# Patient Record
Sex: MALE | Race: WHITE | NOT HISPANIC OR LATINO | Employment: FULL TIME | URBAN - NONMETROPOLITAN AREA
[De-identification: names, ages, dates, MRNs, and addresses within clinical notes are randomized per-mention and may not be internally consistent; named-entity substitution may affect disease eponyms.]

---

## 2021-06-01 ENCOUNTER — HOSPITAL ENCOUNTER (OUTPATIENT)
Dept: NON INVASIVE DIAGNOSTICS | Facility: HOSPITAL | Age: 31
Discharge: HOME | End: 2021-06-01
Payer: COMMERCIAL

## 2021-06-01 DIAGNOSIS — R22.41 LOCALIZED SWELLING, MASS AND LUMP, RIGHT LOWER LIMB: ICD-10-CM

## 2021-06-01 DIAGNOSIS — M79.604 PAIN IN RIGHT LEG: ICD-10-CM

## 2021-06-01 DIAGNOSIS — R31.0 GROSS HEMATURIA: ICD-10-CM

## 2021-06-01 DIAGNOSIS — M51.27 OTHER INTERVERTEBRAL DISC DISPLACEMENT, LUMBOSACRAL REGION: ICD-10-CM

## 2021-06-01 PROCEDURE — 93971 EXTREMITY STUDY: CPT

## 2021-06-01 PROCEDURE — 93971 EXTREMITY STUDY: CPT | Performed by: SURGERY

## 2021-06-25 ENCOUNTER — HOSPITAL ENCOUNTER (OUTPATIENT)
Dept: ULTRASOUND IMAGING | Facility: HOSPITAL | Age: 31
Discharge: HOME | End: 2021-06-25
Payer: COMMERCIAL

## 2021-06-25 DIAGNOSIS — R22.41 LOCALIZED SWELLING, MASS AND LUMP, RIGHT LOWER LIMB: ICD-10-CM

## 2021-06-25 PROCEDURE — 76882 US LMTD JT/FCL EVL NVASC XTR: CPT

## 2021-07-09 ENCOUNTER — HOSPITAL ENCOUNTER (OUTPATIENT)
Dept: MRI IMAGING | Facility: HOSPITAL | Age: 31
Discharge: HOME | End: 2021-07-09
Payer: COMMERCIAL

## 2021-07-09 DIAGNOSIS — M51.27 OTHER INTERVERTEBRAL DISC DISPLACEMENT, LUMBOSACRAL REGION: ICD-10-CM

## 2021-07-09 PROCEDURE — 72148 MRI LUMBAR SPINE W/O DYE: CPT

## 2021-08-23 ENCOUNTER — HOSPITAL ENCOUNTER (OUTPATIENT)
Dept: ULTRASOUND IMAGING | Facility: HOSPITAL | Age: 31
Discharge: HOME | End: 2021-08-23
Attending: STUDENT IN AN ORGANIZED HEALTH CARE EDUCATION/TRAINING PROGRAM
Payer: COMMERCIAL

## 2021-08-23 DIAGNOSIS — R22.41 LOCALIZED SWELLING, MASS AND LUMP, RIGHT LOWER LIMB: ICD-10-CM

## 2021-08-23 PROCEDURE — 76882 US LMTD JT/FCL EVL NVASC XTR: CPT

## 2021-08-30 ENCOUNTER — TRANSCRIBE ORDERS (OUTPATIENT)
Dept: CARDIOLOGY CLINIC | Facility: CLINIC | Age: 31
End: 2021-08-30

## 2021-08-30 DIAGNOSIS — R31.0 GROSS HEMATURIA: Primary | ICD-10-CM

## 2021-09-01 ENCOUNTER — TELEPHONE (OUTPATIENT)
Dept: UROLOGY | Facility: MEDICAL CENTER | Age: 31
End: 2021-09-01

## 2021-09-01 NOTE — TELEPHONE ENCOUNTER
Npt returning call,he was contacted regarding referral he stated he's scheduled for CT Scan tomorrow 9/2,please review records if appointment is time sensitive or wait for CT Scan results

## 2021-09-01 NOTE — TELEPHONE ENCOUNTER
Attempted to reach patient at [phone]  A voice message was left asking patient to return office call  Office number was provided

## 2021-09-01 NOTE — TELEPHONE ENCOUNTER
Patient calling to verify that he is scheduled for ct scan tomorrow  Pt reports he is under the care of his pcp  Based on results of ct scan he may need a new patient appointment with urology

## 2021-09-01 NOTE — TELEPHONE ENCOUNTER
Mplaint/Diagnosis:Gross Hematuria    Insurance:BC BS PPO    History of Cancer:NO    Previous urologist:NO    Outside testing/where:[facility]    If yes,what kind:LABS    Records requested/where:[facility]/CARE EVERY[facility]    Preferred location:[place]

## 2021-09-02 ENCOUNTER — HOSPITAL ENCOUNTER (OUTPATIENT)
Dept: CT IMAGING | Facility: HOSPITAL | Age: 31
Discharge: HOME | End: 2021-09-02
Payer: COMMERCIAL

## 2021-09-02 DIAGNOSIS — R31.0 GROSS HEMATURIA: ICD-10-CM

## 2021-09-02 PROCEDURE — 74178 CT ABD&PLV WO CNTR FLWD CNTR: CPT

## 2021-09-02 RX ADMIN — IOHEXOL 100 ML: 350 INJECTION, SOLUTION INTRAVENOUS at 09:04

## 2021-09-04 ENCOUNTER — HOSPITAL ENCOUNTER (EMERGENCY)
Facility: HOSPITAL | Age: 31
Discharge: HOME | End: 2021-09-04
Attending: EMERGENCY MEDICINE | Admitting: EMERGENCY MEDICINE
Payer: COMMERCIAL

## 2021-09-04 VITALS
RESPIRATION RATE: 16 BRPM | HEART RATE: 75 BPM | OXYGEN SATURATION: 98 % | DIASTOLIC BLOOD PRESSURE: 58 MMHG | TEMPERATURE: 97.9 F | WEIGHT: 189.6 LBS | SYSTOLIC BLOOD PRESSURE: 119 MMHG

## 2021-09-04 DIAGNOSIS — N39.0 URINARY TRACT INFECTION: Primary | ICD-10-CM

## 2021-09-04 DIAGNOSIS — D72.829 LEUKOCYTOSIS: ICD-10-CM

## 2021-09-04 DIAGNOSIS — M54.9 MUSCULOSKELETAL BACK PAIN: ICD-10-CM

## 2021-09-04 LAB
ALBUMIN SERPL BCP-MCNC: 4.6 G/DL (ref 3.5–5)
ALP SERPL-CCNC: 73 U/L (ref 46–116)
ALT SERPL W P-5'-P-CCNC: 36 U/L (ref 12–78)
ANION GAP SERPL CALCULATED.3IONS-SCNC: 10 MMOL/L (ref 4–13)
AST SERPL W P-5'-P-CCNC: 16 U/L (ref 5–45)
BASOPHILS # BLD AUTO: 0.05 THOUSANDS/ΜL (ref 0–0.1)
BASOPHILS NFR BLD AUTO: 0 % (ref 0–1)
BILIRUB SERPL-MCNC: 2.04 MG/DL (ref 0.2–1)
BUN SERPL-MCNC: 17 MG/DL (ref 5–25)
CALCIUM SERPL-MCNC: 8.7 MG/DL (ref 8.3–10.1)
CHLORIDE SERPL-SCNC: 100 MMOL/L (ref 100–108)
CO2 SERPL-SCNC: 28 MMOL/L (ref 21–32)
CREAT SERPL-MCNC: 1.05 MG/DL (ref 0.6–1.3)
EOSINOPHIL # BLD AUTO: 0.01 THOUSAND/ΜL (ref 0–0.61)
EOSINOPHIL NFR BLD AUTO: 0 % (ref 0–6)
ERYTHROCYTE [DISTWIDTH] IN BLOOD BY AUTOMATED COUNT: 12.1 % (ref 11.6–15.1)
GFR SERPL CREATININE-BSD FRML MDRD: 95 ML/MIN/1.73SQ M
GLUCOSE SERPL-MCNC: 137 MG/DL (ref 65–140)
HCT VFR BLD AUTO: 42.3 % (ref 36.5–49.3)
HGB BLD-MCNC: 15.1 G/DL (ref 12–17)
IMM GRANULOCYTES # BLD AUTO: 0.12 THOUSAND/UL (ref 0–0.2)
IMM GRANULOCYTES NFR BLD AUTO: 1 % (ref 0–2)
LIPASE SERPL-CCNC: 107 U/L (ref 73–393)
LYMPHOCYTES # BLD AUTO: 1.67 THOUSANDS/ΜL (ref 0.6–4.47)
LYMPHOCYTES NFR BLD AUTO: 9 % (ref 14–44)
MCH RBC QN AUTO: 30.6 PG (ref 26.8–34.3)
MCHC RBC AUTO-ENTMCNC: 35.7 G/DL (ref 31.4–37.4)
MCV RBC AUTO: 86 FL (ref 82–98)
MONOCYTES # BLD AUTO: 1.97 THOUSAND/ΜL (ref 0.17–1.22)
MONOCYTES NFR BLD AUTO: 11 % (ref 4–12)
NEUTROPHILS # BLD AUTO: 14.71 THOUSANDS/ΜL (ref 1.85–7.62)
NEUTS SEG NFR BLD AUTO: 79 % (ref 43–75)
NRBC BLD AUTO-RTO: 0 /100 WBCS
PLATELET # BLD AUTO: 315 THOUSANDS/UL (ref 149–390)
PMV BLD AUTO: 9.7 FL (ref 8.9–12.7)
POTASSIUM SERPL-SCNC: 3.4 MMOL/L (ref 3.5–5.3)
PROT SERPL-MCNC: 7.9 G/DL (ref 6.4–8.2)
RBC # BLD AUTO: 4.93 MILLION/UL (ref 3.88–5.62)
SODIUM SERPL-SCNC: 138 MMOL/L (ref 136–145)
WBC # BLD AUTO: 18.53 THOUSAND/UL (ref 4.31–10.16)

## 2021-09-04 PROCEDURE — 96374 THER/PROPH/DIAG INJ IV PUSH: CPT

## 2021-09-04 PROCEDURE — 36415 COLL VENOUS BLD VENIPUNCTURE: CPT | Performed by: EMERGENCY MEDICINE

## 2021-09-04 PROCEDURE — 85025 COMPLETE CBC W/AUTO DIFF WBC: CPT | Performed by: EMERGENCY MEDICINE

## 2021-09-04 PROCEDURE — 83690 ASSAY OF LIPASE: CPT | Performed by: EMERGENCY MEDICINE

## 2021-09-04 PROCEDURE — 96372 THER/PROPH/DIAG INJ SC/IM: CPT

## 2021-09-04 PROCEDURE — 99283 EMERGENCY DEPT VISIT LOW MDM: CPT

## 2021-09-04 PROCEDURE — 99285 EMERGENCY DEPT VISIT HI MDM: CPT | Performed by: EMERGENCY MEDICINE

## 2021-09-04 PROCEDURE — 80053 COMPREHEN METABOLIC PANEL: CPT | Performed by: EMERGENCY MEDICINE

## 2021-09-04 RX ORDER — ONDANSETRON 2 MG/ML
4 INJECTION INTRAMUSCULAR; INTRAVENOUS ONCE
Status: COMPLETED | OUTPATIENT
Start: 2021-09-04 | End: 2021-09-04

## 2021-09-04 RX ORDER — CIPROFLOXACIN 500 MG/1
500 TABLET, FILM COATED ORAL ONCE
Status: COMPLETED | OUTPATIENT
Start: 2021-09-04 | End: 2021-09-04

## 2021-09-04 RX ORDER — CIPROFLOXACIN 500 MG/1
500 TABLET, FILM COATED ORAL 2 TIMES DAILY
Qty: 10 TABLET | Refills: 0 | Status: SHIPPED | OUTPATIENT
Start: 2021-09-04 | End: 2021-09-09

## 2021-09-04 RX ORDER — DIAZEPAM 5 MG/1
5 TABLET ORAL ONCE
Status: COMPLETED | OUTPATIENT
Start: 2021-09-04 | End: 2021-09-04

## 2021-09-04 RX ORDER — KETOROLAC TROMETHAMINE 30 MG/ML
30 INJECTION, SOLUTION INTRAMUSCULAR; INTRAVENOUS ONCE
Status: COMPLETED | OUTPATIENT
Start: 2021-09-04 | End: 2021-09-04

## 2021-09-04 RX ADMIN — KETOROLAC TROMETHAMINE 30 MG: 30 INJECTION, SOLUTION INTRAMUSCULAR at 21:23

## 2021-09-04 RX ADMIN — CIPROFLOXACIN 500 MG: 500 TABLET, FILM COATED ORAL at 23:20

## 2021-09-04 RX ADMIN — DIAZEPAM 5 MG: 5 TABLET ORAL at 21:33

## 2021-09-04 RX ADMIN — ONDANSETRON 4 MG: 2 INJECTION INTRAMUSCULAR; INTRAVENOUS at 21:32

## 2021-09-05 NOTE — ED PROVIDER NOTES
History  Chief Complaint   Patient presents with    Back Pain     Pt c/o "bad spasm" in right back since 1800 with vomiting - states CT scan done this week for hematuria but do not know results     Patient is a 51-year-old male with history of lumbar herniated disc presents emergency department complaining of left lower back pain and spasm started 2 days ago came on severe and resolved with a muscle relaxer that he took occurred again this evening the patient also had noted some blood in the urine and had contacted his PCP about this and was ordered to have a CT scan of the abdomen and pelvis which was performed 2 days ago but has not yet resulted  No fevers or chills  Pain worsened by movement and feels like a spasm or cramp in the back  No trauma or injury to the back  No loss of bladder or bowel      History provided by:  Patient  Back Pain  Location:  Lumbar spine  Quality: Spasm  Radiates to:  Does not radiate  Pain severity:  Moderate  Onset quality:  Sudden  Duration:  2 hours  Timing:  Constant  Progression:  Worsening  Chronicity:  Recurrent  Associated symptoms: no abdominal pain, no chest pain, no dysuria, no fever, no headaches, no numbness and no weakness        None       Past Medical History:   Diagnosis Date    Lumbar herniated disc        Past Surgical History:   Procedure Laterality Date    REPAIR LABRUM Bilateral        History reviewed  No pertinent family history  I have reviewed and agree with the history as documented  E-Cigarette/Vaping     E-Cigarette/Vaping Substances     Social History     Tobacco Use    Smoking status: Never Smoker    Smokeless tobacco: Never Used   Substance Use Topics    Alcohol use: Yes     Comment: socially    Drug use: Yes     Types: Marijuana       Review of Systems   Constitutional: Negative for activity change, appetite change, chills, fatigue and fever  HENT: Negative for congestion, ear pain, rhinorrhea and sore throat      Eyes: Negative for discharge, redness and visual disturbance  Respiratory: Negative for cough, chest tightness, shortness of breath and wheezing  Cardiovascular: Negative for chest pain and palpitations  Gastrointestinal: Negative for abdominal pain, constipation, diarrhea, nausea and vomiting  Endocrine: Negative for polydipsia and polyuria  Genitourinary: Positive for hematuria  Negative for difficulty urinating, dysuria, frequency and urgency  Musculoskeletal: Positive for back pain  Negative for arthralgias and myalgias  Skin: Negative for color change, pallor and rash  Neurological: Negative for dizziness, weakness, light-headedness, numbness and headaches  Hematological: Negative for adenopathy  Does not bruise/bleed easily  All other systems reviewed and are negative  Physical Exam  Physical Exam  Vitals and nursing note reviewed  Constitutional:       Appearance: He is well-developed  HENT:      Head: Normocephalic and atraumatic  Right Ear: External ear normal       Left Ear: External ear normal       Nose: Nose normal    Eyes:      Conjunctiva/sclera: Conjunctivae normal       Pupils: Pupils are equal, round, and reactive to light  Cardiovascular:      Rate and Rhythm: Normal rate and regular rhythm  Heart sounds: Normal heart sounds  Pulmonary:      Effort: Pulmonary effort is normal  No respiratory distress  Breath sounds: Normal breath sounds  No wheezing or rales  Chest:      Chest wall: No tenderness  Abdominal:      General: Bowel sounds are normal  There is no distension  Palpations: Abdomen is soft  Tenderness: There is no abdominal tenderness  There is no guarding  Musculoskeletal:         General: Normal range of motion  Cervical back: Normal range of motion and neck supple  Lumbar back: Spasms and tenderness present  Skin:     General: Skin is warm and dry     Neurological:      Mental Status: He is alert and oriented to person, place, and time       Cranial Nerves: No cranial nerve deficit  Sensory: No sensory deficit           Vital Signs  ED Triage Vitals [09/04/21 2101]   Temperature Pulse Respirations Blood Pressure SpO2   97 9 °F (36 6 °C) 76 20 156/95 98 %      Temp Source Heart Rate Source Patient Position - Orthostatic VS BP Location FiO2 (%)   Temporal Monitor Sitting Right arm --      Pain Score       Worst Possible Pain           Vitals:    09/04/21 2101 09/04/21 2218   BP: 156/95 119/58   Pulse: 76 75   Patient Position - Orthostatic VS: Sitting Lying         Visual Acuity      ED Medications  Medications   ciprofloxacin (CIPRO) tablet 500 mg (has no administration in time range)   ketorolac (TORADOL) injection 30 mg (30 mg Intramuscular Given 9/4/21 2123)   diazepam (VALIUM) tablet 5 mg (5 mg Oral Given 9/4/21 2133)   ondansetron (ZOFRAN) injection 4 mg (4 mg Intravenous Given 9/4/21 2132)       Diagnostic Studies  Results Reviewed     Procedure Component Value Units Date/Time    Comprehensive metabolic panel [[id number]]  (Abnormal) Collected: 09/04/21 2131    Lab Status: Final result Specimen: Blood from Arm, Left Updated: 09/04/21 2152     Sodium 138 mmol/L      Potassium 3 4 mmol/L      Chloride 100 mmol/L      CO2 28 mmol/L      ANION GAP 10 mmol/L      BUN 17 mg/dL      Creatinine 1 05 mg/dL      Glucose 137 mg/dL      Calcium 8 7 mg/dL      AST 16 U/L      ALT 36 U/L      Alkaline Phosphatase 73 U/L      Total Protein 7 9 g/dL      Albumin 4 6 g/dL      Total Bilirubin 2 04 mg/dL      eGFR 95 ml/min/1 73sq m     Narrative:      [place] guidelines for Chronic Kidney Disease (CKD):     Stage 1 with normal or high GFR (GFR > 90 mL/min/1 73 square meters)    Stage 2 Mild CKD (GFR = 60-89 mL/min/1 73 square meters)    Stage 3A Moderate CKD (GFR = 45-59 mL/min/1 73 square meters)    Stage 3B Moderate CKD (GFR = 30-44 mL/min/1 73 square meters)    Stage 4 Severe CKD (GFR = 15-29 mL/min/1 73 square meters)    Stage 5 End Stage CKD (GFR <15 mL/min/1 73 square meters)  Note: GFR calculation is accurate only with a steady state creatinine    Lipase [[id number]]  (Normal) Collected: 09/04/21 2131    Lab Status: Final result Specimen: Blood from Arm, Left Updated: 09/04/21 2152     Lipase 107 u/L     CBC and differential [[id number]]  (Abnormal) Collected: 09/04/21 2131    Lab Status: Final result Specimen: Blood from Arm, Left Updated: 09/04/21 2139     WBC 18 53 Thousand/uL      RBC 4 93 Million/uL      Hemoglobin 15 1 g/dL      Hematocrit 42 3 %      MCV 86 fL      MCH 30 6 pg      MCHC 35 7 g/dL      RDW 12 1 %      MPV 9 7 fL      Platelets 167 Thousands/uL      nRBC 0 /100 WBCs      Neutrophils Relative 79 %      Immat GRANS % 1 %      Lymphocytes Relative 9 %      Monocytes Relative 11 %      Eosinophils Relative 0 %      Basophils Relative 0 %      Neutrophils Absolute 14 71 Thousands/µL      Immature Grans Absolute 0 12 Thousand/uL      Lymphocytes Absolute 1 67 Thousands/µL      Monocytes Absolute 1 97 Thousand/µL      Eosinophils Absolute 0 01 Thousand/µL      Basophils Absolute 0 05 Thousands/µL     UA w Reflex to Microscopic w Reflex to Culture [[id number]]     Lab Status: No result Specimen: Urine                  No orders to display              Procedures  Procedures         ED Course  ED Course as of Sep 04 2317   Sat Sep 04, 2021   2201 Reviewed prior bilirubin labs elevated patient reports he has hx of bud  TOTAL BILIRUBIN(!): 2 04        CT abdomen pelvis w wo contrast    Result Date: 9/4/2021  Narrative: CT ABDOMEN AND PELVIS WITH AND WITHOUT IV CONTRAST INDICATION:   R31 0: Gross hematuria  COMPARISON: None  TECHNIQUE: Initial CT of the abdomen and pelvis was performed without intravenous contrast   Subsequently, postcontrast CT evaluation of the abdomen and pelvis was performed simultaneously in both nephrographic and delayed post contrast phases using a split bolus technique    Axial, sagittal, and coronal 2D reformatted images were created from the source data and submitted for interpretation  This examination, like all CT scans performed in the [facility], was performed utilizing techniques to minimize radiation dose exposure, including the use of iterative reconstruction and automated exposure control  Rad dose 1547 06 mGy-cm IV Contrast:  100 mL of iohexol (OMNIPAQUE) Enteric Contrast:  Enteric contrast was administered  FINDINGS: ABDOMEN RIGHT KIDNEY AND URETER: No solid renal mass  No detectable urothelial mass  No hydronephrosis or hydroureter  There is a 3 mm nonobstructing midpole calculus  No perinephric collection  LEFT KIDNEY AND URETER: No solid renal mass  No detectable urothelial mass  No hydronephrosis or hydroureter  There is a 4 mm nonobstructing midpole calculus  No perinephric collection  URINARY BLADDER: No bladder wall mass  No calculi  LOWER CHEST:  No clinically significant abnormality identified in the visualized lower chest  LIVER/BILIARY TREE:  Unremarkable  GALLBLADDER:  No calcified gallstones  No pericholecystic inflammatory change  SPLEEN:  Unremarkable  PANCREAS:  Unremarkable  ADRENAL GLANDS:  Unremarkable  STOMACH AND BOWEL:  Unremarkable  ABDOMINOPELVIC CAVITY:  No ascites  No free intraperitoneal air  No lymphadenopathy  VESSELS:  Unremarkable for patient's age  PELVIS REPRODUCTIVE ORGANS:  Unremarkable for patient's age  APPENDIX: No findings to suggest appendicitis  ABDOMINAL WALL/INGUINAL REGIONS:  Unremarkable  OSSEOUS STRUCTURES:  No acute fracture or destructive osseous lesion  Impression: Bilateral subcentimeter nonobstructing intrarenal calculi  No hydronephrosis or hydroureter  No acute intra-abdominal abnormality  No free air or free fluid  Questionable mild diffuse urinary bladder wall thickening which may be secondary to underdistention  Correlation with a urinalysis is recommended   Workstation performed: [id number] US extremity soft tissue    Result Date: 8/26/2021  Narrative: SUPERFICIAL SOFT TISSUE ULTRASOUND INDICATION:   R22 41: Localized swelling, mass and lump, right lower limb  Lump without change in the thigh COMPARISON:  6/25/2021 TECHNIQUE:   Real-time ultrasound of the medial upper posterior thigh was performed with a linear transducer with both volumetric sweeps and still imaging techniques  FINDINGS:  There is a subcutaneous nodule which is mildly echogenic as compared to surrounding subcutaneous fat and measures 8 x 6 x 12 mm  Previously the transverse dimension was somewhat underestimated and, measured similarly, there has been no significant change in size  The margins are still not well defined  This was close to the buttock  Minimal vascularity is noted  Impression: Ill-defined lesion has not resolved making acute inflammation less likely and other etiologies such as lipoma, angiolipoma or other lesion more likely  Histologic sampling still could be considered  The study was marked in [facility] for significant notification  Workstation performed: [id number]                         SBIRT 22yo+      Most Recent Value   SBIRT (25 yo +)   In order to provide better care to our patients, we are screening all of our patients for alcohol and drug use  Would it be okay to ask you these screening questions? No Filed at: 09/04/2021 2133                    MDM  Number of Diagnoses or Management Options  Leukocytosis: new and requires workup  Musculoskeletal back pain: new and requires workup  Urinary tract infection: new and requires workup  Diagnosis management comments: Patient remained clinically and hemodynamically stable in the emergency department he is afebrile nontoxic well-appearing    Patient felt much improved with rest Toradol and Valium given in the ED   leukocytosis likely secondary to recent steroid tapers that the patient was given suspect the lower back pain to be musculoskeletal however the patient also having suprapubic pressure hematuria on recent urinalysis and presence of leukocytosis and possible urinary tract infection seen on CT scan will treat for possible urinary tract infection with ciprofloxacin for now advised continue NSAIDs and muscle relaxers and supportive care for back pain and follow up promptly with PCP for further evaluation treatment obtain test results return precautions and anticipatory guidance discussed  Amount and/or Complexity of Data Reviewed  Clinical lab tests: reviewed and ordered  Tests in the radiology section of CPT®: reviewed  Tests in the medicine section of CPT®: reviewed and ordered  Decide to obtain previous medical records or to obtain history from someone other than the patient: yes  Review and summarize past medical records: yes  Independent visualization of images, tracings, or specimens: yes    Risk of Complications, Morbidity, and/or Mortality  Presenting problems: moderate  Diagnostic procedures: moderate  Management options: moderate    Patient Progress  Patient progress: stable      Disposition  Final diagnoses:   Urinary tract infection   Musculoskeletal back pain   Leukocytosis     Time reflects when diagnosis was documented in both MDM as applicable and the Disposition within this note     Time User Action Codes Description Comment    9/4/2021 11:08 PM Thyra Catrachita Add [N39 0] Urinary tract infection     9/4/2021 11:09 PM Thyra Catrachita Add [M54 9] Musculoskeletal back pain     9/4/2021 11:09 PM Thyra Catrachita Add [F54 996] Leukocytosis       ED Disposition     ED Disposition Condition Date/Time Comment    Discharge Stable Sat Sep 4, 2021 11:09 PM Wandy Friend discharge to home/self care              Follow-up Information     Follow up With Specialties Details Why Contact Info    Tory Nguyen DO Internal Medicine Schedule an appointment as soon as possible for a visit in 3 days  [address] [place] [address] [address] [facility]  [phone] Patient's Medications   Discharge Prescriptions    CIPROFLOXACIN (CIPRO) 500 MG TABLET    Take 1 tablet (500 mg total) by mouth 2 (two) times a day for 5 days       Start Date: 9/4/2021  End Date: 9/9/2021       Order Dose: 500 mg       Quantity: 10 tablet    Refills: 0     No discharge procedures on file      PDMP Review     None          ED Provider  Electronically Signed by           Lisa Dyer DO  09/04/21 4564

## 2021-09-16 ENCOUNTER — HOSPITAL ENCOUNTER (EMERGENCY)
Facility: HOSPITAL | Age: 31
Discharge: HOME | End: 2021-09-16
Attending: EMERGENCY MEDICINE
Payer: COMMERCIAL

## 2021-09-16 ENCOUNTER — APPOINTMENT (EMERGENCY)
Dept: CT IMAGING | Facility: HOSPITAL | Age: 31
End: 2021-09-16
Payer: COMMERCIAL

## 2021-09-16 VITALS
HEART RATE: 82 BPM | TEMPERATURE: 98.1 F | OXYGEN SATURATION: 100 % | DIASTOLIC BLOOD PRESSURE: 70 MMHG | WEIGHT: 193 LBS | SYSTOLIC BLOOD PRESSURE: 124 MMHG | RESPIRATION RATE: 16 BRPM

## 2021-09-16 DIAGNOSIS — N20.1 URETEROLITHIASIS: Primary | ICD-10-CM

## 2021-09-16 LAB
ANION GAP SERPL CALCULATED.3IONS-SCNC: 11 MMOL/L (ref 4–13)
BASOPHILS # BLD AUTO: 0.04 THOUSANDS/ΜL (ref 0–0.1)
BASOPHILS NFR BLD AUTO: 0 % (ref 0–1)
BILIRUB UR QL STRIP: NEGATIVE
BUN SERPL-MCNC: 17 MG/DL (ref 5–25)
CALCIUM SERPL-MCNC: 8.9 MG/DL (ref 8.3–10.1)
CHLORIDE SERPL-SCNC: 100 MMOL/L (ref 100–108)
CLARITY UR: CLEAR
CO2 SERPL-SCNC: 27 MMOL/L (ref 21–32)
COLOR UR: YELLOW
CREAT SERPL-MCNC: 1.01 MG/DL (ref 0.6–1.3)
EOSINOPHIL # BLD AUTO: 0.01 THOUSAND/ΜL (ref 0–0.61)
EOSINOPHIL NFR BLD AUTO: 0 % (ref 0–6)
ERYTHROCYTE [DISTWIDTH] IN BLOOD BY AUTOMATED COUNT: 11.9 % (ref 11.6–15.1)
GFR SERPL CREATININE-BSD FRML MDRD: 100 ML/MIN/1.73SQ M
GLUCOSE SERPL-MCNC: 119 MG/DL (ref 65–140)
GLUCOSE UR STRIP-MCNC: NEGATIVE MG/DL
HCT VFR BLD AUTO: 42.5 % (ref 36.5–49.3)
HGB BLD-MCNC: 15.3 G/DL (ref 12–17)
HGB UR QL STRIP.AUTO: NEGATIVE
IMM GRANULOCYTES # BLD AUTO: 0.1 THOUSAND/UL (ref 0–0.2)
IMM GRANULOCYTES NFR BLD AUTO: 1 % (ref 0–2)
KETONES UR STRIP-MCNC: ABNORMAL MG/DL
LEUKOCYTE ESTERASE UR QL STRIP: NEGATIVE
LYMPHOCYTES # BLD AUTO: 0.96 THOUSANDS/ΜL (ref 0.6–4.47)
LYMPHOCYTES NFR BLD AUTO: 5 % (ref 14–44)
MCH RBC QN AUTO: 31.2 PG (ref 26.8–34.3)
MCHC RBC AUTO-ENTMCNC: 36 G/DL (ref 31.4–37.4)
MCV RBC AUTO: 87 FL (ref 82–98)
MONOCYTES # BLD AUTO: 1.8 THOUSAND/ΜL (ref 0.17–1.22)
MONOCYTES NFR BLD AUTO: 10 % (ref 4–12)
NEUTROPHILS # BLD AUTO: 15.95 THOUSANDS/ΜL (ref 1.85–7.62)
NEUTS SEG NFR BLD AUTO: 84 % (ref 43–75)
NITRITE UR QL STRIP: NEGATIVE
NRBC BLD AUTO-RTO: 0 /100 WBCS
PH UR STRIP.AUTO: 6.5 [PH]
PLATELET # BLD AUTO: 282 THOUSANDS/UL (ref 149–390)
PMV BLD AUTO: 9.4 FL (ref 8.9–12.7)
POTASSIUM SERPL-SCNC: 3.8 MMOL/L (ref 3.5–5.3)
PROT UR STRIP-MCNC: NEGATIVE MG/DL
RBC # BLD AUTO: 4.91 MILLION/UL (ref 3.88–5.62)
SODIUM SERPL-SCNC: 138 MMOL/L (ref 136–145)
SP GR UR STRIP.AUTO: 1.02 (ref 1–1.03)
UROBILINOGEN UR QL STRIP.AUTO: 0.2 E.U./DL
WBC # BLD AUTO: 18.86 THOUSAND/UL (ref 4.31–10.16)

## 2021-09-16 PROCEDURE — 74176 CT ABD & PELVIS W/O CONTRAST: CPT

## 2021-09-16 PROCEDURE — 81003 URINALYSIS AUTO W/O SCOPE: CPT | Performed by: EMERGENCY MEDICINE

## 2021-09-16 PROCEDURE — 99284 EMERGENCY DEPT VISIT MOD MDM: CPT

## 2021-09-16 PROCEDURE — 96361 HYDRATE IV INFUSION ADD-ON: CPT

## 2021-09-16 PROCEDURE — 85025 COMPLETE CBC W/AUTO DIFF WBC: CPT | Performed by: EMERGENCY MEDICINE

## 2021-09-16 PROCEDURE — 99284 EMERGENCY DEPT VISIT MOD MDM: CPT | Performed by: EMERGENCY MEDICINE

## 2021-09-16 PROCEDURE — 96375 TX/PRO/DX INJ NEW DRUG ADDON: CPT

## 2021-09-16 PROCEDURE — 36415 COLL VENOUS BLD VENIPUNCTURE: CPT | Performed by: EMERGENCY MEDICINE

## 2021-09-16 PROCEDURE — 80048 BASIC METABOLIC PNL TOTAL CA: CPT | Performed by: EMERGENCY MEDICINE

## 2021-09-16 PROCEDURE — 96374 THER/PROPH/DIAG INJ IV PUSH: CPT

## 2021-09-16 RX ORDER — GABAPENTIN 300 MG/1
CAPSULE ORAL
COMMUNITY
Start: 2021-08-19

## 2021-09-16 RX ORDER — MORPHINE SULFATE 4 MG/ML
4 INJECTION, SOLUTION INTRAMUSCULAR; INTRAVENOUS ONCE
Status: COMPLETED | OUTPATIENT
Start: 2021-09-16 | End: 2021-09-16

## 2021-09-16 RX ORDER — TAMSULOSIN HYDROCHLORIDE 0.4 MG/1
CAPSULE ORAL
COMMUNITY
Start: 2021-09-08

## 2021-09-16 RX ORDER — MORPHINE SULFATE 15 MG/1
15 TABLET ORAL EVERY 8 HOURS PRN
Qty: 6 TABLET | Refills: 0 | Status: SHIPPED | OUTPATIENT
Start: 2021-09-16

## 2021-09-16 RX ORDER — TIZANIDINE 2 MG/1
TABLET ORAL
COMMUNITY
Start: 2021-08-19

## 2021-09-16 RX ORDER — TRAMADOL HYDROCHLORIDE 50 MG/1
50 TABLET ORAL
COMMUNITY
Start: 2021-09-08

## 2021-09-16 RX ORDER — KETOROLAC TROMETHAMINE 30 MG/ML
15 INJECTION, SOLUTION INTRAMUSCULAR; INTRAVENOUS ONCE
Status: COMPLETED | OUTPATIENT
Start: 2021-09-16 | End: 2021-09-16

## 2021-09-16 RX ORDER — ONDANSETRON 2 MG/ML
4 INJECTION INTRAMUSCULAR; INTRAVENOUS ONCE
Status: COMPLETED | OUTPATIENT
Start: 2021-09-16 | End: 2021-09-16

## 2021-09-16 RX ORDER — ONDANSETRON 4 MG/1
4 TABLET, FILM COATED ORAL EVERY 6 HOURS PRN
Qty: 10 TABLET | Refills: 0 | Status: SHIPPED | OUTPATIENT
Start: 2021-09-16

## 2021-09-16 RX ADMIN — KETOROLAC TROMETHAMINE 15 MG: 30 INJECTION, SOLUTION INTRAMUSCULAR at 14:01

## 2021-09-16 RX ADMIN — ONDANSETRON 4 MG: 2 INJECTION INTRAMUSCULAR; INTRAVENOUS at 14:05

## 2021-09-16 RX ADMIN — SODIUM CHLORIDE 1000 ML: 0.9 INJECTION, SOLUTION INTRAVENOUS at 14:01

## 2021-09-16 RX ADMIN — MORPHINE SULFATE 4 MG: 4 INJECTION INTRAVENOUS at 14:52

## 2021-09-16 NOTE — ED PROVIDER NOTES
History  Chief Complaint   Patient presents with    Flank Pain     Pt c/o left flank for the past few weeks - saw urologist and had xray yesterday - states hx of hematuria ~ 2 months ago - has also been vomiting     36year old male with wife c/o worsening left flank pain ongoing for the past few months, recently seeing Urology and had CT followed up with plain film yesterday  Not amenable to tramadol  No fever chills  Notes persistent nausea and vomiting  Denies hematuria, dysuria, frequency  History provided by:  Patient and spouse  Flank Pain  Pain location:  L flank  Pain quality: aching and sharp    Pain radiates to:  Does not radiate  Pain severity:  Severe  Onset quality:  Unable to specify  Timing:  Constant  Progression:  Worsening  Chronicity:  Chronic  Associated symptoms: no chest pain, no fever, no hematuria and no shortness of breath        Prior to Admission Medications   Prescriptions Last Dose Informant Patient Reported? Taking?   gabapentin (NEURONTIN) 300 mg capsule   Yes No   tamsulosin (FLOMAX) 0 4 mg   Yes Yes   Sig: Take by mouth   tiZANidine (ZANAFLEX) 2 mg tablet   Yes Yes   Sig: TAKE 1 TABLET (2 MG TOTAL) BY MOUTH EVERY 8 (EIGHT) HOURS AS NEEDED FOR MUSCLE SPASMS    traMADol (ULTRAM) 50 mg tablet   Yes Yes   Sig: Take 50 mg by mouth      Facility-Administered Medications: None       Past Medical History:   Diagnosis Date    Lumbar herniated disc        Past Surgical History:   Procedure Laterality Date    REPAIR LABRUM Bilateral        History reviewed  No pertinent family history  I have reviewed and agree with the history as documented  E-Cigarette/Vaping     E-Cigarette/Vaping Substances     Social History     Tobacco Use    Smoking status: Never Smoker    Smokeless tobacco: Never Used   Substance Use Topics    Alcohol use: Yes     Comment: socially    Drug use: Yes     Types: Marijuana       Review of Systems   Constitutional: Negative for fever     Respiratory: Negative for shortness of breath  Cardiovascular: Negative for chest pain  Genitourinary: Positive for flank pain  Negative for hematuria  All other systems reviewed and are negative  Physical Exam  Physical Exam  Vitals and nursing note reviewed  Constitutional:       Comments: Pleasant, comfortable-appearing   HENT:      Head: Normocephalic and atraumatic  Eyes:      Conjunctiva/sclera: Conjunctivae normal       Pupils: Pupils are equal, round, and reactive to light  Cardiovascular:      Rate and Rhythm: Normal rate and regular rhythm  Heart sounds: Normal heart sounds  Pulmonary:      Effort: Pulmonary effort is normal       Breath sounds: Normal breath sounds  Abdominal:      General: Bowel sounds are normal  There is no distension  Palpations: Abdomen is soft  Tenderness: There is no abdominal tenderness  Comments: No abdominal or flank tenderness, no left flank swelling or overlying skin changes, moves normally without apparent worsening discomfort   Musculoskeletal:         General: Normal range of motion  Cervical back: Neck supple  Skin:     General: Skin is warm and dry  Neurological:      Mental Status: He is alert and oriented to person, place, and time  Cranial Nerves: No cranial nerve deficit  Coordination: Coordination normal    Psychiatric:         Behavior: Behavior normal          Thought Content:  Thought content normal          Judgment: Judgment normal          Vital Signs  ED Triage Vitals [09/16/21 1313]   Temperature Pulse Respirations Blood Pressure SpO2   98 4 °F (36 9 °C) 94 18 140/90 98 %      Temp Source Heart Rate Source Patient Position - Orthostatic VS BP Location FiO2 (%)   Temporal Monitor Lying Right arm --      Pain Score       6           Vitals:    09/16/21 1313 09/16/21 1507   BP: 140/90 124/77   Pulse: 94 83   Patient Position - Orthostatic VS: Lying Lying         Visual Acuity      ED Medications  Medications sodium chloride 0 9 % bolus 1,000 mL (0 mL Intravenous Stopped 9/16/21 1529)   ketorolac (TORADOL) injection 15 mg (15 mg Intravenous Given 9/16/21 1401)   ondansetron (ZOFRAN) injection 4 mg (4 mg Intravenous Given 9/16/21 1405)   morphine (PF) 4 mg/mL injection 4 mg (4 mg Intravenous Given 9/16/21 1452)       Diagnostic Studies  Results Reviewed     Procedure Component Value Units Date/Time    UA w Reflex to Microscopic w Reflex to Culture [[id number]]  (Abnormal) Collected: 09/16/21 1517    Lab Status: Final result Specimen: Urine, Clean Catch Updated: 09/16/21 1524     Color, UA Yellow     Clarity, UA Clear     Specific [place], UA 1 020     pH, UA 6 5     Leukocytes, UA Negative     Nitrite, UA Negative     Protein, UA Negative mg/dl      Glucose, UA Negative mg/dl      Ketones, UA 15 (1+) mg/dl      Urobilinogen, UA 0 2 E U /dl      Bilirubin, UA Negative     Blood, UA Negative    Basic metabolic panel [[id number]] Collected: 09/16/21 1411    Lab Status: Final result Specimen: Blood from Arm, Right Updated: 09/16/21 1431     Sodium 138 mmol/L      Potassium 3 8 mmol/L      Chloride 100 mmol/L      CO2 27 mmol/L      ANION GAP 11 mmol/L      BUN 17 mg/dL      Creatinine 1 01 mg/dL      Glucose 119 mg/dL      Calcium 8 9 mg/dL      eGFR 100 ml/min/1 73sq m     Narrative:      [place] guidelines for Chronic Kidney Disease (CKD):     Stage 1 with normal or high GFR (GFR > 90 mL/min/1 73 square meters)    Stage 2 Mild CKD (GFR = 60-89 mL/min/1 73 square meters)    Stage 3A Moderate CKD (GFR = 45-59 mL/min/1 73 square meters)    Stage 3B Moderate CKD (GFR = 30-44 mL/min/1 73 square meters)    Stage 4 Severe CKD (GFR = 15-29 mL/min/1 73 square meters)    Stage 5 End Stage CKD (GFR <15 mL/min/1 73 square meters)  Note: GFR calculation is accurate only with a steady state creatinine    CBC and differential [[id number]]  (Abnormal) Collected: 09/16/21 1411    Lab Status: Final result Specimen: Blood from Arm, Right Updated: 09/16/21 1421     WBC 18 86 Thousand/uL      RBC 4 91 Million/uL      Hemoglobin 15 3 g/dL      Hematocrit 42 5 %      MCV 87 fL      MCH 31 2 pg      MCHC 36 0 g/dL      RDW 11 9 %      MPV 9 4 fL      Platelets 127 Thousands/uL      nRBC 0 /100 WBCs      Neutrophils Relative 84 %      Immat GRANS % 1 %      Lymphocytes Relative 5 %      Monocytes Relative 10 %      Eosinophils Relative 0 %      Basophils Relative 0 %      Neutrophils Absolute 15 95 Thousands/µL      Immature Grans Absolute 0 10 Thousand/uL      Lymphocytes Absolute 0 96 Thousands/µL      Monocytes Absolute 1 80 Thousand/µL      Eosinophils Absolute 0 01 Thousand/µL      Basophils Absolute 0 04 Thousands/µL                  CT renal stone study abdomen pelvis without contrast   Final Result by Mariposa Boland MD (09/16 1359)      Obstructive 4 mm left UVJ calculus #2/142 causes mild hydroureteronephrosis  The study was marked in [facility] for immediate notification  Workstation performed: [id number]                    Procedures  Procedures         ED Course  ED Course as of Sep 16 1601   Thu Sep 16, 2021   1410 IMPRESSION:     Obstructive 4 mm left UVJ calculus #2/142 causes mild hydroureteronephrosis  CT renal stone study abdomen pelvis without contrast   1412 Informed of results, pain improving      1437 WBC(!): 18 86   1437 Neutrophils %(!): 84   1549 Ketones, UA(!): 15 (1+)   1554 Feels well, discomfort 2/10    Agree with close outpatient follow-up with his urologist, spouse present and supportive                                              MDM    Disposition  Final diagnoses:   Ureterolithiasis     Time reflects when diagnosis was documented in both MDM as applicable and the Disposition within this note     Time User Action Codes Description Comment    9/16/2021  2:11 PM Shawna Mortensen Add [N20 1] Ureterolithiasis       ED Disposition     ED Disposition Condition Date/Time Comment    Discharge Stable Thu Sep 16, 2021  3:54 PM Yasmeen Maya discharge to home/self care  Follow-up Information     Follow up With Specialties Details Why DO [facility] Call today  [address] [address]  [phone]            Patient's Medications   Discharge Prescriptions    MORPHINE (MSIR) 15 MG TABLET    Take 1 tablet (15 mg total) by mouth every 8 (eight) hours as needed for severe pain for up to 6 dosesMax Daily Amount: 45 mg       Start Date: 9/16/2021 End Date: --       Order Dose: 15 mg       Quantity: 6 tablet    Refills: 0    ONDANSETRON (ZOFRAN) 4 MG TABLET    Take 1 tablet (4 mg total) by mouth every 6 (six) hours as needed for nausea or vomiting       Start Date: 9/16/2021 End Date: --       Order Dose: 4 mg       Quantity: 10 tablet    Refills: 0     No discharge procedures on file      PDMP Review     None          ED Provider  Electronically Signed by           Courtney Alexis DO  09/16/21 6407

## 2022-01-13 PROCEDURE — 87636 SARSCOV2 & INF A&B AMP PRB: CPT | Performed by: INTERNAL MEDICINE

## 2024-11-27 RX ORDER — IBUPROFEN 200 MG
TABLET ORAL EVERY 6 HOURS PRN
COMMUNITY

## 2024-11-27 NOTE — PRE-PROCEDURE INSTRUCTIONS
Pre-Surgery Instructions:   Medication Instructions    Ascorbic Acid (VITAMIN C PO) Stop taking 7 days prior to surgery.    Cyanocobalamin (VITAMIN B-12 PO) Stop taking 7 days prior to surgery.    ibuprofen (MOTRIN) 200 mg tablet Stop taking 3 days prior to surgery.    Omega-3 Fatty Acids (FISH OIL PO) Stop taking 7 days prior to surgery.    VITAMIN D PO Stop taking 7 days prior to surgery.    Medication instructions for day surgery reviewed. Please use only a sip of water to take your instructed medications. Avoid all over the counter vitamins, supplements and NSAIDS for one week prior to surgery per anesthesia guidelines. Tylenol is ok to take as needed.     You will receive a call one business day prior to surgery with an arrival time and hospital directions. If your surgery is scheduled on a Monday, the hospital will be calling you on the Friday prior to your surgery. If you have not heard from anyone by 8pm, please call the hospital supervisor through the hospital  at [phone]. ([place] [phone] or [place] [phone]).    Do not eat or drink anything after midnight the night before your surgery, including candy, mints, lifesavers, or chewing gum. Do not drink alcohol 24hrs before your surgery. Try not to smoke at least 24hrs before your surgery.       Follow the pre surgery showering instructions as listed in the “My Surgical Experience Booklet” or otherwise provided by your surgeon's office. Do not use a blade to shave the surgical area 1 week before surgery. It is okay to use a clean electric clippers up to 24 hours before surgery. Do not apply any lotions, creams, including makeup, cologne, deodorant, or perfumes after showering on the day of your surgery. Do not use dry shampoo, hair spray, hair gel, or any type of hair products.     No contact lenses, eye make-up, or artificial eyelashes. Remove nail polish, including gel polish, and any artificial, gel, or acrylic nails if possible.  Remove all jewelry including rings and body piercing jewelry.     Wear causal clothing that is easy to take on and off. Consider your type of surgery.    Keep any valuables, jewelry, piercings at home. Please bring any specially ordered equipment (sling, braces) if indicated.    Arrange for a responsible person to drive you to and from the hospital on the day of your surgery. Please confirm the visitor policy for the day of your procedure when you receive your phone call with an arrival time.     Call the surgeon's office with any new illnesses, exposures, or additional questions prior to surgery.    Please reference your “My Surgical Experience Booklet” for additional information to prepare for your upcoming surgery.  Pt has surgical soap.

## 2024-12-07 ENCOUNTER — ANESTHESIA EVENT (OUTPATIENT)
Dept: PERIOP | Facility: HOSPITAL | Age: 34
End: 2024-12-07
Payer: COMMERCIAL

## 2024-12-07 NOTE — ANESTHESIA PREPROCEDURE EVALUATION
"Procedure:  EXCISION LIPOMAS OF RIGHT BUTTOCK, RT UPPER THIGH AND LEFT GROIN (Bilateral: Buttocks)    Relevant Problems   ANESTHESIA   (+) Motion sickness      CARDIO (within normal limits)      ENDO (within normal limits)      GI/HEPATIC (within normal limits)      /RENAL (within normal limits)      HEMATOLOGY (within normal limits)      NEURO/PSYCH (within normal limits)      Behavioral Health   (+) Marijuana smoker      Other Pediatrics   (+) Gilbert syndrome      Lab Results   Component Value Date    WBC 18.86 (H) 09/16/2021    HGB 15.3 09/16/2021    HCT 42.5 09/16/2021    MCV 87 09/16/2021     09/16/2021     Lab Results   Component Value Date    SODIUM 137 02/05/2024    K 4.2 02/05/2024     02/05/2024    CO2 25 02/05/2024    BUN 12 02/05/2024    CREATININE 0.8 02/05/2024    GLUC 102 02/05/2024    CALCIUM 9.1 02/05/2024     No results found for: \"INR\", \"PROTIME\"  No results found for: \"HGBA1C\"         Physical Exam    Airway    Mallampati score: I  TM Distance: >3 FB  Neck ROM: full     Dental       Cardiovascular  Cardiovascular exam normal    Pulmonary  Pulmonary exam normal     Other Findings        Anesthesia Plan  ASA Score- 2     Anesthesia Type- IV sedation with anesthesia with ASA Monitors.         Additional Monitors:     Airway Plan:            Plan Factors-Exercise tolerance (METS): >4 METS.    Chart reviewed.   Existing labs reviewed. Patient summary reviewed.                  Induction- intravenous.    Postoperative Plan-         Informed Consent- Anesthetic plan and risks discussed with patient.  I personally reviewed this patient with the CRNA. Discussed and agreed on the Anesthesia Plan with the CRNA..        "

## 2024-12-09 ENCOUNTER — HOSPITAL ENCOUNTER (OUTPATIENT)
Facility: HOSPITAL | Age: 34
Setting detail: OUTPATIENT SURGERY
Discharge: HOME | End: 2024-12-09
Attending: STUDENT IN AN ORGANIZED HEALTH CARE EDUCATION/TRAINING PROGRAM | Admitting: STUDENT IN AN ORGANIZED HEALTH CARE EDUCATION/TRAINING PROGRAM
Payer: COMMERCIAL

## 2024-12-09 ENCOUNTER — ANESTHESIA (OUTPATIENT)
Dept: PERIOP | Facility: HOSPITAL | Age: 34
End: 2024-12-09
Payer: COMMERCIAL

## 2024-12-09 VITALS
RESPIRATION RATE: 18 BRPM | HEART RATE: 70 BPM | DIASTOLIC BLOOD PRESSURE: 80 MMHG | TEMPERATURE: 97.8 F | OXYGEN SATURATION: 98 % | WEIGHT: 210 LBS | BODY MASS INDEX: 28.44 KG/M2 | HEIGHT: 72 IN | SYSTOLIC BLOOD PRESSURE: 129 MMHG

## 2024-12-09 DIAGNOSIS — D17.9 BENIGN LIPOMATOUS NEOPLASM, UNSPECIFIED: ICD-10-CM

## 2024-12-09 PROBLEM — F12.90 MARIJUANA SMOKER: Status: ACTIVE | Noted: 2024-12-09

## 2024-12-09 PROCEDURE — 88304 TISSUE EXAM BY PATHOLOGIST: CPT | Performed by: STUDENT IN AN ORGANIZED HEALTH CARE EDUCATION/TRAINING PROGRAM

## 2024-12-09 RX ORDER — ONDANSETRON 2 MG/ML
INJECTION INTRAMUSCULAR; INTRAVENOUS AS NEEDED
Status: DISCONTINUED | OUTPATIENT
Start: 2024-12-09 | End: 2024-12-09

## 2024-12-09 RX ORDER — PROPOFOL 10 MG/ML
INJECTION, EMULSION INTRAVENOUS CONTINUOUS PRN
Status: DISCONTINUED | OUTPATIENT
Start: 2024-12-09 | End: 2024-12-09

## 2024-12-09 RX ORDER — MAGNESIUM HYDROXIDE 1200 MG/15ML
LIQUID ORAL AS NEEDED
Status: DISCONTINUED | OUTPATIENT
Start: 2024-12-09 | End: 2024-12-09 | Stop reason: HOSPADM

## 2024-12-09 RX ORDER — BUPIVACAINE HYDROCHLORIDE AND EPINEPHRINE 2.5; 5 MG/ML; UG/ML
INJECTION, SOLUTION INFILTRATION; PERINEURAL AS NEEDED
Status: DISCONTINUED | OUTPATIENT
Start: 2024-12-09 | End: 2024-12-09 | Stop reason: HOSPADM

## 2024-12-09 RX ORDER — SODIUM CHLORIDE, SODIUM LACTATE, POTASSIUM CHLORIDE, CALCIUM CHLORIDE 600; 310; 30; 20 MG/100ML; MG/100ML; MG/100ML; MG/100ML
INJECTION, SOLUTION INTRAVENOUS CONTINUOUS PRN
Status: DISCONTINUED | OUTPATIENT
Start: 2024-12-09 | End: 2024-12-09

## 2024-12-09 RX ORDER — ONDANSETRON 2 MG/ML
4 INJECTION INTRAMUSCULAR; INTRAVENOUS ONCE AS NEEDED
Status: DISCONTINUED | OUTPATIENT
Start: 2024-12-09 | End: 2024-12-09 | Stop reason: HOSPADM

## 2024-12-09 RX ORDER — FENTANYL CITRATE/PF 50 MCG/ML
25 SYRINGE (ML) INJECTION
Status: DISCONTINUED | OUTPATIENT
Start: 2024-12-09 | End: 2024-12-09 | Stop reason: HOSPADM

## 2024-12-09 RX ORDER — LIDOCAINE HYDROCHLORIDE 10 MG/ML
INJECTION, SOLUTION EPIDURAL; INFILTRATION; INTRACAUDAL; PERINEURAL AS NEEDED
Status: DISCONTINUED | OUTPATIENT
Start: 2024-12-09 | End: 2024-12-09

## 2024-12-09 RX ORDER — KETAMINE HCL IN NACL, ISO-OSM 100MG/10ML
SYRINGE (ML) INJECTION AS NEEDED
Status: DISCONTINUED | OUTPATIENT
Start: 2024-12-09 | End: 2024-12-09

## 2024-12-09 RX ORDER — FENTANYL CITRATE 50 UG/ML
INJECTION, SOLUTION INTRAMUSCULAR; INTRAVENOUS AS NEEDED
Status: DISCONTINUED | OUTPATIENT
Start: 2024-12-09 | End: 2024-12-09

## 2024-12-09 RX ORDER — MIDAZOLAM HYDROCHLORIDE 2 MG/2ML
INJECTION, SOLUTION INTRAMUSCULAR; INTRAVENOUS AS NEEDED
Status: DISCONTINUED | OUTPATIENT
Start: 2024-12-09 | End: 2024-12-09

## 2024-12-09 RX ADMIN — SODIUM CHLORIDE 8 MCG: 9 INJECTION, SOLUTION INTRAVENOUS at 09:00

## 2024-12-09 RX ADMIN — MIDAZOLAM 2 MG: 1 INJECTION INTRAMUSCULAR; INTRAVENOUS at 08:55

## 2024-12-09 RX ADMIN — SODIUM CHLORIDE, SODIUM LACTATE, POTASSIUM CHLORIDE, AND CALCIUM CHLORIDE: .6; .31; .03; .02 INJECTION, SOLUTION INTRAVENOUS at 08:55

## 2024-12-09 RX ADMIN — Medication 20 MG: at 09:00

## 2024-12-09 RX ADMIN — FENTANYL CITRATE 50 MCG: 50 INJECTION, SOLUTION INTRAMUSCULAR; INTRAVENOUS at 09:03

## 2024-12-09 RX ADMIN — ONDANSETRON 4 MG: 2 INJECTION INTRAMUSCULAR; INTRAVENOUS at 09:39

## 2024-12-09 RX ADMIN — PROPOFOL 130 MCG/KG/MIN: 10 INJECTION, EMULSION INTRAVENOUS at 09:00

## 2024-12-09 RX ADMIN — LIDOCAINE HYDROCHLORIDE 50 MG: 10 INJECTION, SOLUTION EPIDURAL; INFILTRATION; INTRACAUDAL; PERINEURAL at 09:00

## 2024-12-09 NOTE — OP NOTE
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  The areas of surgery were marked preoperatively.  A timeout was held the patient identified and procedure verified.  Appropriate antibiotic prophylaxis and DVT prophylaxis was used.  The patient was carefully placed in the left lateral decubitus position.  All pressure points were padded.  Sedation was administered.  The area of the right buttock was prepped and draped needle sterile fashion using chlorhexidine.  Local anesthesia using 0.25% Marcaine with epinephrine was infiltrated over the palpable lipoma.  Dissection was carried down through subcutaneous tissue using blunt dissection.  The lipoma was immediately  encountered.  The lipoma was freed of surrounding subcutaneous tissue using blunt and sharp dissection.  Lipoma was removed in its entirety.  The area was irrigated.  Subcutaneous tissue was closed using 3-0 Vicryl.  Skin was closing 4-0 Vicryl in the subcuticular layer.  Incision was covered with skin glue.  The glue was allowed to dry and the patient was then placed in the supine position.  Bilateral upper thighs were prepped and draped in usual sterile fashion using chlorhexidine.  Local anesthesia was infiltrated over the palpable lipoma of the left upper thigh.  An incision was made using a 15 blade scalpel.  The subcutaneous tissue surrounding the lipoma was bluntly dissected free.  The lipoma was removed in its entirety.  Skin was closing 4-0 Vicryl in the subcuticular layer.  Local anesthesia was infiltrated over the palpable right upper thigh lipoma.  An incision was made using a 15 blade scalpel.  Dissection was carried down into subcutaneous tissue using blunt dissection.  Lipoma was encountered and freed of surrounding tissue using sharp dissection.  The lipoma was removed in its entirety.  The vision was closing 4-0 Vicryl in the subcuticular layer.  The incisions were covered with skin glue.  The patient tolerated procedure well transferred to recovery in stable condition.  At the end the procedure all needle instrument sponge counts were correct x 2.   I was present for the entire procedure. and A physician assistant was required during the procedure for retraction, tissue handling, dissection and suturing.    Patient Disposition:  PACU     This procedure was not performed to treat primary cutaneous melanoma through wide local excision           SIGNATURE: Le Castillo DO  DATE: December 9, 2024  TIME: 9:45 AM

## 2024-12-09 NOTE — DISCHARGE INSTR - AVS FIRST PAGE
Lipoma Excision  Call Dr. Castillo's office with any questions or concerns [phone]    AFTER YOU LEAVE:     You may shower but do not tub bathe for 2 weeks.  The incision may get wet and pat dry.  The glue will fall off on its own in 1-2 weeks.    Use Tylenol or ibuprofen as directed on the bottle for pain control    You may apply ice to the incision as needed for comfort    Follow-up as scheduled    Seek care immediately or call 911 if:   You feel a lump in your throat or have trouble swallowing.    You suddenly have trouble breathing.

## 2024-12-09 NOTE — ANESTHESIA POSTPROCEDURE EVALUATION
Post-Op Assessment Note    CV Status:  Stable    Pain management: adequate       Mental Status:  Alert and awake   Hydration Status:  Euvolemic   PONV Controlled:  Controlled   Airway Patency:  Patent     Post Op Vitals Reviewed: Yes    No anethesia notable event occurred.    Staff: Anesthesiologist         Last Filed PACU Vitals:  Vitals Value Taken Time   Temp 97.8 °F (36.6 °C) 12/09/24 1020   Pulse 80 12/09/24 1023   /75 12/09/24 1020   Resp 21 12/09/24 1023   SpO2 96 % 12/09/24 1023   Vitals shown include unfiled device data.    Modified Danny:  Activity: 2 (12/9/2024 10:55 AM)  Respiration: 2 (12/9/2024 10:55 AM)  Circulation: 2 (12/9/2024 10:55 AM)  Consciousness: 2 (12/9/2024 10:55 AM)  Oxygen Saturation: 2 (12/9/2024 10:55 AM)  Modified Danny Score: 10 (12/9/2024 10:55 AM)

## 2024-12-09 NOTE — ANESTHESIA POSTPROCEDURE EVALUATION
Post-Op Assessment Note    CV Status:  Stable  Pain Score: 0    Pain management: adequate       Mental Status:  Alert and awake   Hydration Status:  Stable and euvolemic   PONV Controlled:  None   Airway Patency:  Patent and adequate     Post Op Vitals Reviewed: Yes    No anethesia notable event occurred.    Staff: CRNA           Last Filed PACU Vitals:  Vitals Value Taken Time   Temp 97.6 °F (36.4 °C) 12/09/24 0950   Pulse 75 12/09/24 0952   /72 12/09/24 0950   Resp 15 12/09/24 0952   SpO2 97 % 12/09/24 0952   Vitals shown include unfiled device data.    Modified Danny:  Activity: 2 (12/9/2024  9:50 AM)  Respiration: 2 (12/9/2024  9:50 AM)  Circulation: 2 (12/9/2024  9:50 AM)  Consciousness: 1 (12/9/2024  9:50 AM)  Oxygen Saturation: 1 (12/9/2024  9:50 AM)  Modified Danny Score: 8 (12/9/2024  9:50 AM)

## 2024-12-09 NOTE — H&P
[facility] [place]    Date: 12/9/2024      H&P    Referring Provider: Abbey Peterson PA-C    CC: Multiple lipomas    HPI: This is a 34-year-old male who presents due to multiple lipomas. The patient states he has 3 areas he is concerned about. There was an area of the left groin which is uncomfortable. He states his pants rub on this area. There is an additional lipoma of the right upper thigh. This area is slightly painful. There is an additional small lipoma of the right buttock which is also slightly uncomfortable.     History:  Past Medical History:   Diagnosis Date   Depressive disorder, not elsewhere classified   GILBERT SYNDROME 7/21/2009   Spina bifida occulta 12/28/2010     Past Surgical History:   Procedure Laterality Date   DENTAL SURGERY PROCEDURE NEC   EXCISION OF LESION FOR PATHOLOGY Right 09/08/2021   Excision of lump R upper leg/[place]   SHOULDER SURGERY PROCEDURE NEC Left   labral tear repair   SHOULDER SURGERY PROCEDURE NEC Right     Family History   Problem Relation Name Age of Onset   Stroke Grandmother (Maternal)     Social History     Tobacco Use   Smoking status: Never   Smokeless tobacco: Never   Vaping Use   Vaping status: Some Days   Substance Use Topics   Alcohol use: Not Currently   Comment: social   Drug use: Yes   Types: Marijuana   Comment: has medical card     Review of patient's allergies indicates:   Allergen Reactions   Amoxicillin   Pt thinks he may get a rash.   Has taken keflex without reaction   Gabapentin   Other reaction(s): Other (See Comments)  Nausea ,dizziness     No current outpatient medications on file.     No current facility-administered medications for this visit.       ROS:  Constitutional: Denies weakness and fatigue  ENT: Denies congestion  Resp: Denies shortness of breath  Cardiac: Denies chest pain  GI: Denies abdominal pain, nausea, vomiting  Musculoskeletal: Denies muscle ache  Neuro: Denies headache  Heme: Denies history of  "bleeding or blood clots  Endo: Denies fever  Skin: Denies skin changes    PE:  Vital Signs: /94   Pulse 82   Temp 98.4 °F (36.9 °C) (Oral)   Resp 18   Ht 5' 11.5\" (1.816 m)   Wt 95.3 kg (210 lb)   SpO2 95%   BMI 28.88 kg/m²     Gen: No acute distress, appears comfortable  HEENT: Normocephalic, atraumatic  Lungs: Clear to auscultation bilateral  Heart: Regular rate and rhythm, no murmur  Ext: No edema  Skin: Positive for 2 cm lipoma of the left upper thigh, 1 cm lipoma of the right upper thigh, and 1 cm lipoma of the right buttock area which is deep and difficult to palpate  Neuro: Awake, alert, oriented x3    Labs:  None available    Radiology: None available    Assessment:  34-year-old male who presents due to 3 lipomas which are bothersome. The lipomas of the right and left upper thigh area are easily amenable to excision. The right buttock area is deep and difficult to feel on examination.     Plan:   Options were discussed with the patient including continued observation or surgical excision. The patient would like to proceed with surgical excision of all 3 areas. He would prefer for this to be completed under sedation in the operating room.  The procedure of excision of lipomas of bilateral upper thighs, and right buttock was discussed in detail with the patient. Risks were discussed including bleeding and infection. The patient expressed understanding and agrees to proceed        "

## 2024-12-12 PROCEDURE — 88304 TISSUE EXAM BY PATHOLOGIST: CPT | Performed by: STUDENT IN AN ORGANIZED HEALTH CARE EDUCATION/TRAINING PROGRAM

## 2024-12-23 RX ORDER — ASCORBIC ACID
CRYSTALS ORAL
COMMUNITY

## 2024-12-23 NOTE — PRE-PROCEDURE INSTRUCTIONS
Pre-Surgery Instructions:   Medication Instructions    Ascorbic Acid (VITAMIN C PO) Stop taking 7 days prior to surgery.    Cyanocobalamin (VITAMIN B-12 PO) Stop taking 7 days prior to surgery.    Omega-3 Fatty Acids (FISH OIL PO) Stop taking 7 days prior to surgery.    VITAMIN D PO Stop taking 7 days prior to surgery.    B Complex Vitamins (B COMPLEX PO) Stop taking 7 days prior to surgery.    Vitamin E 200 units TABS Stop taking 7 days prior to surgery.   Medication instructions for day surgery reviewed. Please use only a sip of water to take your instructed medications. Avoid all over the counter vitamins, supplements and NSAIDS for one week prior to surgery per anesthesia guidelines. Tylenol is ok to take as needed.     You will receive a call one business day prior to surgery with an arrival time and hospital directions. If your surgery is scheduled on a Monday, the hospital will be calling you on the Friday prior to your surgery. If you have not heard from anyone by 8pm, please call the hospital supervisor through the hospital  at [phone]. ([place] [phone] or [place] [phone]).    Do not eat or drink anything after midnight the night before your surgery, including candy, mints, lifesavers, or chewing gum. Do not drink alcohol 24hrs before your surgery. Try not to smoke at least 24hrs before your surgery.       Follow the pre surgery showering instructions as listed in the “My Surgical Experience Booklet” or otherwise provided by your surgeon's office. Do not use a blade to shave the surgical area 1 week before surgery. It is okay to use a clean electric clippers up to 24 hours before surgery. Do not apply any lotions, creams, including makeup, cologne, deodorant, or perfumes after showering on the day of your surgery. Do not use dry shampoo, hair spray, hair gel, or any type of hair products.     No contact lenses, eye make-up, or artificial eyelashes. Remove nail polish, including gel  polish, and any artificial, gel, or acrylic nails if possible. Remove all jewelry including rings and body piercing jewelry.     Wear causal clothing that is easy to take on and off. Consider your type of surgery.    Keep any valuables, jewelry, piercings at home. Please bring any specially ordered equipment (sling, braces) if indicated.    Arrange for a responsible person to drive you to and from the hospital on the day of your surgery. Please confirm the visitor policy for the day of your procedure when you receive your phone call with an arrival time.     Call the surgeon's office with any new illnesses, exposures, or additional questions prior to surgery.    Please reference your “My Surgical Experience Booklet” for additional information to prepare for your upcoming surgery.

## 2024-12-26 ENCOUNTER — ANESTHESIA EVENT (OUTPATIENT)
Dept: PERIOP | Facility: HOSPITAL | Age: 34
End: 2024-12-26
Payer: COMMERCIAL

## 2024-12-26 NOTE — ANESTHESIA PREPROCEDURE EVALUATION
Procedure:  EXCISION LIPOMA OF RIGHT POSTERIOR THIGH LIPOMA X2, EXCISION OF LEFT FOREARM LIPOMA (Bilateral: Thigh)    Relevant Problems   ANESTHESIA   (+) Motion sickness        Physical Exam    Airway    Mallampati score: II  TM Distance: >3 FB  Neck ROM: full     Dental   No notable dental hx     Cardiovascular  Cardiovascular exam normal    Pulmonary  Pulmonary exam normal     Other Findings        Anesthesia Plan  ASA Score- 2     Anesthesia Type- IV sedation with anesthesia with ASA Monitors.         Additional Monitors:     Airway Plan:            Plan Factors-Exercise tolerance (METS): >4 METS.    Chart reviewed.  Imaging results reviewed. Existing labs reviewed. Patient summary reviewed.    Patient is not a current smoker.      Obstructive sleep apnea risk education given perioperatively.        Induction- intravenous.    Postoperative Plan-     Perioperative Resuscitation Plan - Level 1 - Full Code.       Informed Consent- Anesthetic plan and risks discussed with patient.  I personally reviewed this patient with the CRNA. Discussed and agreed on the Anesthesia Plan with the CRNA..

## 2024-12-31 ENCOUNTER — ANESTHESIA (OUTPATIENT)
Dept: PERIOP | Facility: HOSPITAL | Age: 34
End: 2024-12-31
Payer: COMMERCIAL

## 2024-12-31 ENCOUNTER — HOSPITAL ENCOUNTER (OUTPATIENT)
Facility: HOSPITAL | Age: 34
Setting detail: OUTPATIENT SURGERY
Discharge: HOME | End: 2024-12-31
Attending: STUDENT IN AN ORGANIZED HEALTH CARE EDUCATION/TRAINING PROGRAM | Admitting: STUDENT IN AN ORGANIZED HEALTH CARE EDUCATION/TRAINING PROGRAM
Payer: COMMERCIAL

## 2024-12-31 VITALS
TEMPERATURE: 97.2 F | HEIGHT: 72 IN | DIASTOLIC BLOOD PRESSURE: 88 MMHG | HEART RATE: 63 BPM | BODY MASS INDEX: 28.44 KG/M2 | RESPIRATION RATE: 18 BRPM | SYSTOLIC BLOOD PRESSURE: 142 MMHG | OXYGEN SATURATION: 99 % | WEIGHT: 210 LBS

## 2024-12-31 DIAGNOSIS — D17.9 BENIGN LIPOMATOUS NEOPLASM, UNSPECIFIED: ICD-10-CM

## 2024-12-31 PROCEDURE — 88304 TISSUE EXAM BY PATHOLOGIST: CPT | Performed by: PATHOLOGY

## 2024-12-31 RX ORDER — ACETAMINOPHEN 325 MG/1
975 TABLET ORAL ONCE
Status: COMPLETED | OUTPATIENT
Start: 2024-12-31 | End: 2024-12-31

## 2024-12-31 RX ORDER — KETOROLAC TROMETHAMINE 30 MG/ML
INJECTION, SOLUTION INTRAMUSCULAR; INTRAVENOUS AS NEEDED
Status: DISCONTINUED | OUTPATIENT
Start: 2024-12-31 | End: 2024-12-31

## 2024-12-31 RX ORDER — GLYCOPYRROLATE 0.2 MG/ML
INJECTION INTRAMUSCULAR; INTRAVENOUS AS NEEDED
Status: DISCONTINUED | OUTPATIENT
Start: 2024-12-31 | End: 2024-12-31

## 2024-12-31 RX ORDER — MAGNESIUM HYDROXIDE 1200 MG/15ML
LIQUID ORAL AS NEEDED
Status: DISCONTINUED | OUTPATIENT
Start: 2024-12-31 | End: 2024-12-31 | Stop reason: HOSPADM

## 2024-12-31 RX ORDER — MIDAZOLAM HYDROCHLORIDE 2 MG/2ML
INJECTION, SOLUTION INTRAMUSCULAR; INTRAVENOUS AS NEEDED
Status: DISCONTINUED | OUTPATIENT
Start: 2024-12-31 | End: 2024-12-31

## 2024-12-31 RX ORDER — BUPIVACAINE HYDROCHLORIDE AND EPINEPHRINE 2.5; 5 MG/ML; UG/ML
INJECTION, SOLUTION INFILTRATION; PERINEURAL AS NEEDED
Status: DISCONTINUED | OUTPATIENT
Start: 2024-12-31 | End: 2024-12-31 | Stop reason: HOSPADM

## 2024-12-31 RX ORDER — FENTANYL CITRATE 50 UG/ML
INJECTION, SOLUTION INTRAMUSCULAR; INTRAVENOUS AS NEEDED
Status: DISCONTINUED | OUTPATIENT
Start: 2024-12-31 | End: 2024-12-31

## 2024-12-31 RX ORDER — DEXMEDETOMIDINE HYDROCHLORIDE 4 UG/ML
INJECTION, SOLUTION INTRAVENOUS AS NEEDED
Status: DISCONTINUED | OUTPATIENT
Start: 2024-12-31 | End: 2024-12-31

## 2024-12-31 RX ORDER — LIDOCAINE HYDROCHLORIDE 10 MG/ML
INJECTION, SOLUTION EPIDURAL; INFILTRATION; INTRACAUDAL; PERINEURAL AS NEEDED
Status: DISCONTINUED | OUTPATIENT
Start: 2024-12-31 | End: 2024-12-31

## 2024-12-31 RX ORDER — ONDANSETRON 2 MG/ML
INJECTION INTRAMUSCULAR; INTRAVENOUS AS NEEDED
Status: DISCONTINUED | OUTPATIENT
Start: 2024-12-31 | End: 2024-12-31

## 2024-12-31 RX ORDER — SODIUM CHLORIDE, SODIUM LACTATE, POTASSIUM CHLORIDE, CALCIUM CHLORIDE 600; 310; 30; 20 MG/100ML; MG/100ML; MG/100ML; MG/100ML
INJECTION, SOLUTION INTRAVENOUS CONTINUOUS PRN
Status: DISCONTINUED | OUTPATIENT
Start: 2024-12-31 | End: 2024-12-31

## 2024-12-31 RX ORDER — HYDROMORPHONE HCL/PF 1 MG/ML
0.5 SYRINGE (ML) INJECTION
Status: DISCONTINUED | OUTPATIENT
Start: 2024-12-31 | End: 2024-12-31 | Stop reason: HOSPADM

## 2024-12-31 RX ORDER — PROPOFOL 10 MG/ML
INJECTION, EMULSION INTRAVENOUS AS NEEDED
Status: DISCONTINUED | OUTPATIENT
Start: 2024-12-31 | End: 2024-12-31

## 2024-12-31 RX ADMIN — LIDOCAINE HYDROCHLORIDE 50 MG: 10 INJECTION, SOLUTION EPIDURAL; INFILTRATION; INTRACAUDAL; PERINEURAL at 10:45

## 2024-12-31 RX ADMIN — ONDANSETRON 4 MG: 2 INJECTION INTRAMUSCULAR; INTRAVENOUS at 10:55

## 2024-12-31 RX ADMIN — PROPOFOL 100 MG: 10 INJECTION, EMULSION INTRAVENOUS at 10:45

## 2024-12-31 RX ADMIN — KETOROLAC TROMETHAMINE 30 MG: 30 INJECTION, SOLUTION INTRAMUSCULAR; INTRAVENOUS at 11:15

## 2024-12-31 RX ADMIN — PROPOFOL 150 MCG/KG/MIN: 10 INJECTION, EMULSION INTRAVENOUS at 10:46

## 2024-12-31 RX ADMIN — GLYCOPYRROLATE 0.2 MG: 0.2 INJECTION, SOLUTION INTRAMUSCULAR; INTRAVENOUS at 10:41

## 2024-12-31 RX ADMIN — FENTANYL CITRATE 50 MCG: 50 INJECTION INTRAMUSCULAR; INTRAVENOUS at 10:45

## 2024-12-31 RX ADMIN — DEXMEDETOMIDINE HYDROCHLORIDE 8 MCG: 400 INJECTION INTRAVENOUS at 10:45

## 2024-12-31 RX ADMIN — MIDAZOLAM 2 MG: 1 INJECTION INTRAMUSCULAR; INTRAVENOUS at 10:41

## 2024-12-31 RX ADMIN — ACETAMINOPHEN 325MG 975 MG: 325 TABLET ORAL at 09:18

## 2024-12-31 RX ADMIN — SODIUM CHLORIDE, SODIUM LACTATE, POTASSIUM CHLORIDE, AND CALCIUM CHLORIDE: .6; .31; .03; .02 INJECTION, SOLUTION INTRAVENOUS at 10:41

## 2024-12-31 NOTE — INTERVAL H&P NOTE
H&P reviewed. After examining the patient I find no changes in the patients condition since the H&P had been written.    Vitals:    12/31/24 0916   BP: 140/97   Pulse: 81   Resp: 20   Temp: 98.5 °F (36.9 °C)   SpO2: 96%

## 2024-12-31 NOTE — DISCHARGE INSTR - AVS FIRST PAGE
Lipoma Excision  Call Dr. Castillo's office with any questions or concerns [phone]    AFTER YOU LEAVE:     You may shower but do not tub bathe for 2 weeks.  The incision may get wet and pat dry.  The glue will fall off on its own in 1-2 weeks.    Use Tylenol or ibuprofen as directed on the bottle for pain control    You may apply ice to the incision as needed for comfort    Follow-up as scheduled    Seek care immediately or call 911 if:   You feel a lump in your throat or have trouble swallowing.    You suddenly have trouble breathing.

## 2024-12-31 NOTE — ANESTHESIA POSTPROCEDURE EVALUATION
Post-Op Assessment Note    Last Filed PACU Vitals:  Vitals Value Taken Time   Temp 97.1 °F (36.2 °C) 12/31/24 1209   Pulse 72 12/31/24 1209   /77 12/31/24 1209   Resp 18 12/31/24 1209   SpO2 99 % 12/31/24 1209       Modified Danny:  Activity: 2 (12/31/2024 12:09 PM)  Respiration: 2 (12/31/2024 12:09 PM)  Circulation: 2 (12/31/2024 12:09 PM)  Consciousness: 2 (12/31/2024 12:09 PM)  Oxygen Saturation: 2 (12/31/2024 12:09 PM)  Modified Danny Score: 10 (12/31/2024 12:09 PM)

## 2024-12-31 NOTE — OP NOTE
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 EXCISION OF LEFT FOREARM LIPOMA    Specimen(s):  ID Type Source Tests Collected by Time Destination   1 : left forearm lipoma Tissue Soft Tissue, Other TISSUE EXAM Le Castillo, DO 2024 11:12 AM    2 : right anterior thigh lipoma Tissue Soft Tissue, Other TISSUE EXAM Le Castillo, DO 2024 11:12 AM    3 : right lateral thigh lipoma Tissue Soft Tissue, Other TISSUE EXAM Le Castillo, DO 2024 11:12 AM    4 : right posterior thigh lipoma Tissue Soft Tissue, Other TISSUE EXAM Le Castillo, DO 2024 11:12 AM        Estimated Blood Loss:   Minimal    Drains:  * No LDAs found *    Anesthesia Type:   IV Sedation with Anesthesia    Operative Indications:  Benign lipomatous neoplasm, 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  Timeout was held the patient identified and procedure verified.  Surgical sites were appropriately marked prior to the patient presenting to the operating room.  The patient was placed in the supine position and slightly rotated towards the left.  The right superior posterior thigh and left forearm are prepped and draped you sterile fashion using chlorhexidine solution.  Local anesthesia using 0.25% Marcaine with epinephrine  was infiltrated over the palpable lump of the left forearm.  Incision was made using a 15 blade scalpel.  The lipoma was identified immediately within the subcutaneous tissue.  Lipoma was removed intact.  The area was irrigated.  Hemostasis was achieved use electrocautery.  Incision was closing 4-0 Vicryl in the subcuticular layer.  Attention was then turned towards the right thigh.  Local anesthesia was infiltrated over each palpable lipoma.  An incision was made over each lipoma using a 15 blade scalpel.  Blunt and sharp dissection was used to excise each lipoma in its entirety.  Each incision was irrigated.  3-0 Vicryl was used to close the subcutaneous tissue of each surgical site.  The skin was then closed with 4-0 Vicryl in the subcuticular layer.  All incisions were covered with skin glue.  The patient tarted the procedure well was transferred to recovery in stable condition.  At the end the procedure all needle instrument sponge counts were correct x 2.   I was present for the entire procedure. and A physician assistant was required during the procedure for retraction, tissue handling, dissection and suturing.    Patient Disposition:  PACU     This procedure was not performed to treat primary cutaneous melanoma through wide local excision           SIGNATURE: Le Castillo DO  DATE: December 31, 2024  TIME: 11:19 AM

## 2024-12-31 NOTE — ANESTHESIA POSTPROCEDURE EVALUATION
Post-Op Assessment Note    CV Status:  Stable    Pain management: adequate    Multimodal analgesia used between 6 hours prior to anesthesia start to PACU discharge    Mental Status:  Sleepy   Hydration Status:  Euvolemic   PONV Controlled:  Controlled   Airway Patency:  Patent     Post Op Vitals Reviewed: Yes    No anethesia notable event occurred.    Staff: CRNA           Last Filed PACU Vitals:  Vitals Value Taken Time   Temp 98.4    Pulse 82 12/31/24 1140   /69 12/31/24 1140   Resp 16    SpO2 98 % 12/31/24 1140   Vitals shown include unfiled device data.    Modified Danny:  No data recorded

## 2025-01-02 PROCEDURE — 88304 TISSUE EXAM BY PATHOLOGIST: CPT | Performed by: PATHOLOGY

## 2025-01-20 ENCOUNTER — TELEPHONE (OUTPATIENT)
Dept: UROLOGY | Facility: CLINIC | Age: 35
End: 2025-01-20

## 2025-01-20 ENCOUNTER — APPOINTMENT (EMERGENCY)
Dept: CT IMAGING | Facility: HOSPITAL | Age: 35
End: 2025-01-20
Payer: COMMERCIAL

## 2025-01-20 ENCOUNTER — HOSPITAL ENCOUNTER (EMERGENCY)
Facility: HOSPITAL | Age: 35
Discharge: HOME | End: 2025-01-20
Attending: STUDENT IN AN ORGANIZED HEALTH CARE EDUCATION/TRAINING PROGRAM | Admitting: EMERGENCY MEDICINE
Payer: COMMERCIAL

## 2025-01-20 VITALS
OXYGEN SATURATION: 98 % | DIASTOLIC BLOOD PRESSURE: 81 MMHG | HEART RATE: 81 BPM | RESPIRATION RATE: 15 BRPM | SYSTOLIC BLOOD PRESSURE: 142 MMHG | TEMPERATURE: 96.6 F

## 2025-01-20 DIAGNOSIS — R10.31 RIGHT LOWER QUADRANT ABDOMINAL PAIN: Primary | ICD-10-CM

## 2025-01-20 DIAGNOSIS — N20.0 KIDNEY STONE: ICD-10-CM

## 2025-01-20 LAB
ALBUMIN SERPL BCG-MCNC: 4.9 G/DL (ref 3.5–5)
ALP SERPL-CCNC: 75 U/L (ref 34–104)
ALT SERPL W P-5'-P-CCNC: 20 U/L (ref 7–52)
AMORPH PHOS CRY URNS QL MICRO: ABNORMAL /HPF
ANION GAP SERPL CALCULATED.3IONS-SCNC: 9 MMOL/L (ref 4–13)
AST SERPL W P-5'-P-CCNC: 16 U/L (ref 13–39)
BACTERIA UR QL AUTO: ABNORMAL /HPF
BASOPHILS # BLD AUTO: 0.04 THOUSANDS/ΜL (ref 0–0.1)
BASOPHILS NFR BLD AUTO: 0 % (ref 0–1)
BILIRUB SERPL-MCNC: 1.77 MG/DL (ref 0.2–1)
BILIRUB UR QL STRIP: NEGATIVE
BUN SERPL-MCNC: 12 MG/DL (ref 5–25)
CALCIUM SERPL-MCNC: 9.1 MG/DL (ref 8.4–10.2)
CHLORIDE SERPL-SCNC: 102 MMOL/L (ref 96–108)
CLARITY UR: CLEAR
CO2 SERPL-SCNC: 27 MMOL/L (ref 21–32)
COLOR UR: YELLOW
CREAT SERPL-MCNC: 0.75 MG/DL (ref 0.6–1.3)
EOSINOPHIL # BLD AUTO: 0.01 THOUSAND/ΜL (ref 0–0.61)
EOSINOPHIL NFR BLD AUTO: 0 % (ref 0–6)
ERYTHROCYTE [DISTWIDTH] IN BLOOD BY AUTOMATED COUNT: 12.1 % (ref 11.6–15.1)
GFR SERPL CREATININE-BSD FRML MDRD: 121 ML/MIN/1.73SQ M
GLUCOSE SERPL-MCNC: 128 MG/DL (ref 65–140)
GLUCOSE UR STRIP-MCNC: NEGATIVE MG/DL
HCT VFR BLD AUTO: 47.2 % (ref 36.5–49.3)
HGB BLD-MCNC: 16.5 G/DL (ref 12–17)
HGB UR QL STRIP.AUTO: ABNORMAL
IMM GRANULOCYTES # BLD AUTO: 0.08 THOUSAND/UL (ref 0–0.2)
IMM GRANULOCYTES NFR BLD AUTO: 1 % (ref 0–2)
KETONES UR STRIP-MCNC: ABNORMAL MG/DL
LEUKOCYTE ESTERASE UR QL STRIP: NEGATIVE
LIPASE SERPL-CCNC: 26 U/L (ref 11–82)
LYMPHOCYTES # BLD AUTO: 1.1 THOUSANDS/ΜL (ref 0.6–4.47)
LYMPHOCYTES NFR BLD AUTO: 7 % (ref 14–44)
MCH RBC QN AUTO: 29.2 PG (ref 26.8–34.3)
MCHC RBC AUTO-ENTMCNC: 35 G/DL (ref 31.4–37.4)
MCV RBC AUTO: 83 FL (ref 82–98)
MONOCYTES # BLD AUTO: 0.83 THOUSAND/ΜL (ref 0.17–1.22)
MONOCYTES NFR BLD AUTO: 6 % (ref 4–12)
MUCOUS THREADS UR QL AUTO: ABNORMAL
NEUTROPHILS # BLD AUTO: 12.95 THOUSANDS/ΜL (ref 1.85–7.62)
NEUTS SEG NFR BLD AUTO: 86 % (ref 43–75)
NITRITE UR QL STRIP: NEGATIVE
NON-SQ EPI CELLS URNS QL MICRO: ABNORMAL /HPF
NRBC BLD AUTO-RTO: 0 /100 WBCS
PH UR STRIP.AUTO: 7.5 [PH]
PLATELET # BLD AUTO: 265 THOUSANDS/UL (ref 149–390)
PMV BLD AUTO: 9.6 FL (ref 8.9–12.7)
POTASSIUM SERPL-SCNC: 3.5 MMOL/L (ref 3.5–5.3)
PROT SERPL-MCNC: 7.7 G/DL (ref 6.4–8.4)
PROT UR STRIP-MCNC: NEGATIVE MG/DL
RBC # BLD AUTO: 5.66 MILLION/UL (ref 3.88–5.62)
RBC #/AREA URNS AUTO: ABNORMAL /HPF
SODIUM SERPL-SCNC: 138 MMOL/L (ref 135–147)
SP GR UR STRIP.AUTO: 1.01 (ref 1–1.03)
UROBILINOGEN UR QL STRIP.AUTO: 0.2 E.U./DL
WBC # BLD AUTO: 15.01 THOUSAND/UL (ref 4.31–10.16)
WBC #/AREA URNS AUTO: ABNORMAL /HPF

## 2025-01-20 PROCEDURE — 74177 CT ABD & PELVIS W/CONTRAST: CPT

## 2025-01-20 PROCEDURE — 36415 COLL VENOUS BLD VENIPUNCTURE: CPT | Performed by: STUDENT IN AN ORGANIZED HEALTH CARE EDUCATION/TRAINING PROGRAM

## 2025-01-20 PROCEDURE — 85025 COMPLETE CBC W/AUTO DIFF WBC: CPT | Performed by: STUDENT IN AN ORGANIZED HEALTH CARE EDUCATION/TRAINING PROGRAM

## 2025-01-20 PROCEDURE — 80053 COMPREHEN METABOLIC PANEL: CPT | Performed by: STUDENT IN AN ORGANIZED HEALTH CARE EDUCATION/TRAINING PROGRAM

## 2025-01-20 PROCEDURE — 99285 EMERGENCY DEPT VISIT HI MDM: CPT | Performed by: STUDENT IN AN ORGANIZED HEALTH CARE EDUCATION/TRAINING PROGRAM

## 2025-01-20 PROCEDURE — 81001 URINALYSIS AUTO W/SCOPE: CPT | Performed by: STUDENT IN AN ORGANIZED HEALTH CARE EDUCATION/TRAINING PROGRAM

## 2025-01-20 PROCEDURE — 99284 EMERGENCY DEPT VISIT MOD MDM: CPT

## 2025-01-20 PROCEDURE — 83690 ASSAY OF LIPASE: CPT | Performed by: STUDENT IN AN ORGANIZED HEALTH CARE EDUCATION/TRAINING PROGRAM

## 2025-01-20 PROCEDURE — 96365 THER/PROPH/DIAG IV INF INIT: CPT

## 2025-01-20 PROCEDURE — 96375 TX/PRO/DX INJ NEW DRUG ADDON: CPT

## 2025-01-20 RX ORDER — ONDANSETRON 2 MG/ML
4 INJECTION INTRAMUSCULAR; INTRAVENOUS ONCE
Status: COMPLETED | OUTPATIENT
Start: 2025-01-20 | End: 2025-01-20

## 2025-01-20 RX ORDER — KETOROLAC TROMETHAMINE 30 MG/ML
15 INJECTION, SOLUTION INTRAMUSCULAR; INTRAVENOUS ONCE
Status: COMPLETED | OUTPATIENT
Start: 2025-01-20 | End: 2025-01-20

## 2025-01-20 RX ORDER — SODIUM CHLORIDE, SODIUM GLUCONATE, SODIUM ACETATE, POTASSIUM CHLORIDE, MAGNESIUM CHLORIDE, SODIUM PHOSPHATE, DIBASIC, AND POTASSIUM PHOSPHATE .53; .5; .37; .037; .03; .012; .00082 G/100ML; G/100ML; G/100ML; G/100ML; G/100ML; G/100ML; G/100ML
1000 INJECTION, SOLUTION INTRAVENOUS ONCE
Status: COMPLETED | OUTPATIENT
Start: 2025-01-20 | End: 2025-01-20

## 2025-01-20 RX ORDER — NAPROXEN 500 MG/1
500 TABLET ORAL 2 TIMES DAILY WITH MEALS
Qty: 30 TABLET | Refills: 0 | Status: SHIPPED | OUTPATIENT
Start: 2025-01-20 | End: 2025-01-24

## 2025-01-20 RX ORDER — TAMSULOSIN HYDROCHLORIDE 0.4 MG/1
0.4 CAPSULE ORAL
Qty: 30 CAPSULE | Refills: 0 | Status: SHIPPED | OUTPATIENT
Start: 2025-01-20

## 2025-01-20 RX ADMIN — SODIUM CHLORIDE, SODIUM GLUCONATE, SODIUM ACETATE, POTASSIUM CHLORIDE, MAGNESIUM CHLORIDE, SODIUM PHOSPHATE, DIBASIC, AND POTASSIUM PHOSPHATE 1000 ML: .53; .5; .37; .037; .03; .012; .00082 INJECTION, SOLUTION INTRAVENOUS at 14:10

## 2025-01-20 RX ADMIN — KETOROLAC TROMETHAMINE 15 MG: 30 INJECTION, SOLUTION INTRAMUSCULAR; INTRAVENOUS at 14:12

## 2025-01-20 RX ADMIN — IOHEXOL 75 ML: 350 INJECTION, SOLUTION INTRAVENOUS at 15:05

## 2025-01-20 RX ADMIN — ONDANSETRON 4 MG: 2 INJECTION INTRAMUSCULAR; INTRAVENOUS at 14:12

## 2025-01-20 NOTE — ED PROVIDER NOTES
Time reflects when diagnosis was documented in both MDM as applicable and the Disposition within this note       Time User Action Codes Description Comment    1/20/2025  3:21 PM Thierry Comer Add [R10.31] Right lower quadrant abdominal pain           ED Disposition       None          Assessment & Plan       Medical Decision Making  This patient presents with RLQ abdominal pain.   Diagnostic considerations include appendicitis, diverticulitis, ureteral stone, UTI, hydronephrosis.    Vital signs reviewed.  History of kidney stones.  Presents with right lower quadrant abdominal pain.  Having associated nausea/vomiting.  The patient has tenderness of palpation along the right lower quadrant.  No CVA tenderness.  Laboratory workup significant for leukocytosis.  CT imaging interpreted by me.  Signs of a right ureteral calculus with hydronephrosis.  Official radiology interpretation is pending.  The case was signed out to Dr. Berry. Plan discussed.         Amount and/or Complexity of Data Reviewed  Labs: ordered.     Details: Leukocytosis  Radiology: ordered.     Details: CT imaging interpreted by me. Signs of a right ureteral calculus with hydronephrosis.     Risk  Prescription drug management.        Medications   multi-electrolyte (ISOLYTE-S PH 7.4) bolus 1,000 mL (0 mL Intravenous Stopped 1/20/25 1515)   ondansetron (ZOFRAN) injection 4 mg (4 mg Intravenous Given 1/20/25 1412)   ketorolac (TORADOL) injection 15 mg (15 mg Intravenous Given 1/20/25 1412)   iohexol (OMNIPAQUE) 350 MG/ML injection (MULTI-DOSE) 75 mL (75 mL Intravenous Given 1/20/25 1505)     ED Risk Strat Scores        SBIRT 22yo+      Flowsheet Row Most Recent Value   Initial Alcohol Screen: US AUDIT-C     1. How often do you have a drink containing alcohol? 0 Filed at: 01/20/2025 1326   2. How many drinks containing alcohol do you have on a typical day you are drinking?  0 Filed at: 01/20/2025 1326   3a. Male UNDER 65: How often do you have five or  more drinks on one occasion? 0 Filed at: 01/20/2025 1326   Audit-C Score 0 Filed at: 01/20/2025 1326   NITO: How many times in the past year have you...    Used an illegal drug or used a prescription medication for non-medical reasons? Never Filed at: 01/20/2025 1326                            History of Present Illness       Chief Complaint   Patient presents with    Abdominal Pain     Pt reports lower right side abd pain with n/v that started around 10 am.        Past Medical History:   Diagnosis Date    Back pain     Gilbert syndrome     Lumbar herniated disc     Motion sickness       Past Surgical History:   Procedure Laterality Date    LIPOMA RESECTION Bilateral 12/9/2024    Procedure: EXCISION LIPOMAS OF RIGHT BUTTOCK, RT UPPER THIGH AND LEFT GROIN;  Surgeon: Le Castillo DO;  Location: [facility];  Service: General    LIPOMA RESECTION Bilateral 12/31/2024    Procedure: EXCISION LIPOMA OF RIGHT POSTERIOR THIGH LIPOMA X3, EXCISION OF LEFT FOREARM LIPOMA;  Surgeon: Le Castillo DO;  Location: [facility];  Service: General    REPAIR LABRUM Bilateral     WISDOM TOOTH EXTRACTION        History reviewed. No pertinent family history.   Social History     Tobacco Use    Smoking status: Never    Smokeless tobacco: Never   Vaping Use    Vaping status: Some Days    Substances: THC   Substance Use Topics    Alcohol use: Yes     Comment: socially    Drug use: Yes     Types: Marijuana     Comment: medical      E-Cigarette/Vaping    E-Cigarette Use Current Some Day User       E-Cigarette/Vaping Substances    Nicotine No     THC Yes     CBD No     Flavoring No       I have reviewed and agree with the history as documented.     Patient presents with right sided abdominal pain. Started this AM (1000). Having associated nausea/vomiting. Attempted to take a dose of Motrin. Having decreased oral intake. Hx of kidney stones. Denies fevers but expressing chills.       History provided by:   Patient  Abdominal Pain  Pain location: right sided abdominal pain.  Pain radiates to:  Does not radiate  Pain severity:  Moderate  Onset quality:  Gradual  Duration:  3 hours  Timing:  Constant  Progression:  Unchanged  Chronicity:  New  Relieved by:  Nothing  Worsened by:  Movement, coughing, palpation, position changes and vomiting  Associated symptoms: anorexia, chills, nausea and vomiting    Associated symptoms: no chest pain, no cough, no diarrhea, no dysuria, no fatigue, no fever, no hematemesis, no hematochezia, no hematuria, no melena and no shortness of breath      Review of Systems   Constitutional:  Positive for appetite change and chills. Negative for activity change, fatigue and fever.   Respiratory:  Negative for cough, chest tightness, shortness of breath and wheezing.    Cardiovascular:  Negative for chest pain.   Gastrointestinal:  Positive for abdominal pain, anorexia, nausea and vomiting. Negative for diarrhea, hematemesis, hematochezia and melena.   Genitourinary:  Negative for decreased urine volume, difficulty urinating, dysuria, flank pain, frequency, hematuria and urgency.   All other systems reviewed and are negative.    Objective       ED Triage Vitals   Temperature Pulse Blood Pressure Respirations SpO2 Patient Position - Orthostatic VS   01/20/25 1324 01/20/25 1324 01/20/25 1324 01/20/25 1324 01/20/25 1324 01/20/25 1324   (!) 96.6 °F (35.9 °C) 79 153/99 18 98 % Sitting      Temp Source Heart Rate Source BP Location FiO2 (%) Pain Score    01/20/25 1324 01/20/25 1324 01/20/25 1324 -- 01/20/25 1412    Temporal Monitor Left arm  10 - Worst Possible Pain      Vitals      Date and Time Temp Pulse SpO2 Resp BP Pain Score FACES Pain Rating User   01/20/25 1520 -- 88 97 % 16 146/85 No Pain -- NB   01/20/25 1412 -- -- -- -- -- 10 - Worst Possible Pain -- NB   01/20/25 1324 96.6 °F (35.9 °C) 79 98 % 18 153/99 -- -- MB            Physical Exam  Vitals and nursing note reviewed.   Constitutional:        General: He is in acute distress.      Appearance: He is ill-appearing. He is not toxic-appearing.   HENT:      Head: Normocephalic and atraumatic.   Eyes:      General: No scleral icterus.     Extraocular Movements: Extraocular movements intact.   Cardiovascular:      Rate and Rhythm: Normal rate and regular rhythm.      Heart sounds: Normal heart sounds. No murmur heard.  Pulmonary:      Effort: Pulmonary effort is normal. No respiratory distress.      Breath sounds: Normal breath sounds. No wheezing or rhonchi.   Chest:      Chest wall: No tenderness.   Abdominal:      General: Abdomen is flat. Bowel sounds are normal. There is no distension.      Palpations: Abdomen is soft.      Tenderness: There is abdominal tenderness in the right lower quadrant. There is no right CVA tenderness, left CVA tenderness, guarding or rebound.      Hernia: No hernia is present.   Skin:     General: Skin is warm and dry.      Coloration: Skin is not cyanotic, mottled or pale.   Neurological:      General: No focal deficit present.      Mental Status: He is alert and oriented to person, place, and time.   Psychiatric:         Mood and Affect: Mood normal.         Behavior: Behavior normal.       Results Reviewed       Procedure Component Value Units Date/Time    UA w Reflex to Microscopic w Reflex to Culture [[id number]] Collected: 01/20/25 1522    Lab Status: No result Specimen: Urine, Clean Catch     Comprehensive metabolic panel [[id number]]  (Abnormal) Collected: 01/20/25 1410    Lab Status: Final result Specimen: Blood from Arm, Right Updated: 01/20/25 1443     Sodium 138 mmol/L      Potassium 3.5 mmol/L      Chloride 102 mmol/L      CO2 27 mmol/L      ANION GAP 9 mmol/L      BUN 12 mg/dL      Creatinine 0.75 mg/dL      Glucose 128 mg/dL      Calcium 9.1 mg/dL      AST 16 U/L      ALT 20 U/L      Alkaline Phosphatase 75 U/L      Total Protein 7.7 g/dL      Albumin 4.9 g/dL      Total Bilirubin 1.77 mg/dL      eGFR 121  ml/min/1.73sq m     Narrative:      [employer] Kid[employer] guidelines for Chronic Kidney Disease (CKD):     Stage 1 with normal or high GFR (GFR > 90 mL/min/1.73 square meters)    Stage 2 Mild CKD (GFR = 60-89 mL/min/1.73 square meters)    Stage 3A Moderate CKD (GFR = 45-59 mL/min/1.73 square meters)    Stage 3B Moderate CKD (GFR = 30-44 mL/min/1.73 square meters)    Stage 4 Severe CKD (GFR = 15-29 mL/min/1.73 square meters)    Stage 5 End Stage CKD (GFR <15 mL/min/1.73 square meters)  Note: GFR calculation is accurate only with a steady state creatinine    Lipase [[id number]]  (Normal) Collected: 01/20/25 1410    Lab Status: Final result Specimen: Blood from Arm, Right Updated: 01/20/25 1443     Lipase 26 u/L     CBC and differential [[id number]]  (Abnormal) Collected: 01/20/25 1410    Lab Status: Final result Specimen: Blood from Arm, Right Updated: 01/20/25 1428     WBC 15.01 Thousand/uL      RBC 5.66 Million/uL      Hemoglobin 16.5 g/dL      Hematocrit 47.2 %      MCV 83 fL      MCH 29.2 pg      MCHC 35.0 g/dL      RDW 12.1 %      MPV 9.6 fL      Platelets 265 Thousands/uL      nRBC 0 /100 WBCs      Segmented % 86 %      Immature Grans % 1 %      Lymphocytes % 7 %      Monocytes % 6 %      Eosinophils Relative 0 %      Basophils Relative 0 %      Absolute Neutrophils 12.95 Thousands/µL      Absolute Immature Grans 0.08 Thousand/uL      Absolute Lymphocytes 1.10 Thousands/µL      Absolute Monocytes 0.83 Thousand/µL      Eosinophils Absolute 0.01 Thousand/µL      Basophils Absolute 0.04 Thousands/µL             CT abdomen pelvis with contrast    (Results Pending)       Procedures    ED Medication and Procedure Management   Prior to Admission Medications   Prescriptions Last Dose Informant Patient Reported? Taking?   Ascorbic Acid (VITAMIN C PO)   Yes No   Sig: Take by mouth   B Complex Vitamins (B COMPLEX PO)   Yes No   Sig: Take 1 tablet by mouth daily   Cyanocobalamin (VITAMIN B-12 PO)   Yes No   Sig:  Take by mouth   Omega-3 Fatty Acids (FISH OIL PO)   Yes No   Sig: Take by mouth   VITAMIN D PO   Yes No   Sig: Take by mouth   Vitamin E 200 units TABS   Yes No   Sig: Take by mouth      Facility-Administered Medications: None     Patient's Medications   Discharge Prescriptions    No medications on file     No discharge procedures on file.  ED SEPSIS DOCUMENTATION   Time reflects when diagnosis was documented in both MDM as applicable and the Disposition within this note       Time User Action Codes Description Comment    1/20/2025  3:21 PM Thierry Comer Add [R10.31] Right lower quadrant abdominal pain                  Thierry Comer DO  01/20/25 1524

## 2025-01-20 NOTE — TELEPHONE ENCOUNTER
Patient with 7 mm ureteral stone with obstruction. No JESSICA. WBC 15. Pain is controlled. Please have patient follow up in office with AP within 1-2 weeks. Thanks

## 2025-01-20 NOTE — ED CARE HANDOFF
Emergency Department Sign Out Note        Sign out and transfer of care from Dr. Comer. See Separate Emergency Department note.     The patient, Denver Vera, was evaluated by the previous provider for abdominal pain.    Workup Completed:  CT and labs    ED Course / Workup Pending (followup):  CT results and reassessment pending                                     Procedures  Medical Decision Making  Amount and/or Complexity of Data Reviewed  Labs: ordered.  Radiology: ordered.    Risk  Prescription drug management.            Disposition  Final diagnoses:   None     ED Disposition       None          Follow-up Information    None       Patient's Medications   Discharge Prescriptions    No medications on file     No discharge procedures on file.       ED Provider  Electronically Signed by     Mathieu Berry DO  01/20/25 9862

## 2025-01-21 NOTE — TELEPHONE ENCOUNTER
Pt returned missed call. Pt could not come in in one to two weeks due to work schedule.     Scheduled for 2/21/25 with Rosanna at our [place] location.    Pt Cb: [phone]

## 2025-01-22 ENCOUNTER — ANESTHESIA EVENT (INPATIENT)
Dept: PERIOP | Facility: HOSPITAL | Age: 35
DRG: 661 | End: 2025-01-22
Payer: COMMERCIAL

## 2025-01-22 ENCOUNTER — HOSPITAL ENCOUNTER (INPATIENT)
Facility: HOSPITAL | Age: 35
LOS: 2 days | Discharge: HOME | DRG: 661 | End: 2025-01-24
Attending: EMERGENCY MEDICINE | Admitting: FAMILY MEDICINE
Payer: COMMERCIAL

## 2025-01-22 DIAGNOSIS — R10.9 ACUTE RIGHT FLANK PAIN: Primary | ICD-10-CM

## 2025-01-22 DIAGNOSIS — R11.10 VOMITING: ICD-10-CM

## 2025-01-22 DIAGNOSIS — N20.1 RIGHT URETERAL CALCULUS: ICD-10-CM

## 2025-01-22 DIAGNOSIS — R52 INTRACTABLE PAIN: ICD-10-CM

## 2025-01-22 DIAGNOSIS — N20.0 KIDNEY STONE ON RIGHT SIDE: ICD-10-CM

## 2025-01-22 PROBLEM — E87.6 HYPOKALEMIA: Status: ACTIVE | Noted: 2025-01-22

## 2025-01-22 LAB
ALBUMIN SERPL BCG-MCNC: 4.4 G/DL (ref 3.5–5)
ALP SERPL-CCNC: 74 U/L (ref 34–104)
ALT SERPL W P-5'-P-CCNC: 18 U/L (ref 7–52)
ANION GAP SERPL CALCULATED.3IONS-SCNC: 7 MMOL/L (ref 4–13)
APTT PPP: 26 SECONDS (ref 23–34)
AST SERPL W P-5'-P-CCNC: 13 U/L (ref 13–39)
BACTERIA UR QL AUTO: ABNORMAL /HPF
BASOPHILS # BLD AUTO: 0.03 THOUSANDS/ΜL (ref 0–0.1)
BASOPHILS NFR BLD AUTO: 0 % (ref 0–1)
BILIRUB SERPL-MCNC: 1.08 MG/DL (ref 0.2–1)
BILIRUB UR QL STRIP: NEGATIVE
BUN SERPL-MCNC: 16 MG/DL (ref 5–25)
CALCIUM SERPL-MCNC: 8.5 MG/DL (ref 8.4–10.2)
CHLORIDE SERPL-SCNC: 103 MMOL/L (ref 96–108)
CLARITY UR: ABNORMAL
CO2 SERPL-SCNC: 29 MMOL/L (ref 21–32)
COLOR UR: YELLOW
CREAT SERPL-MCNC: 0.78 MG/DL (ref 0.6–1.3)
EOSINOPHIL # BLD AUTO: 0.05 THOUSAND/ΜL (ref 0–0.61)
EOSINOPHIL NFR BLD AUTO: 1 % (ref 0–6)
ERYTHROCYTE [DISTWIDTH] IN BLOOD BY AUTOMATED COUNT: 12.4 % (ref 11.6–15.1)
GFR SERPL CREATININE-BSD FRML MDRD: 119 ML/MIN/1.73SQ M
GLUCOSE SERPL-MCNC: 117 MG/DL (ref 65–140)
GLUCOSE UR STRIP-MCNC: NEGATIVE MG/DL
HCT VFR BLD AUTO: 42.4 % (ref 36.5–49.3)
HGB BLD-MCNC: 14.8 G/DL (ref 12–17)
HGB UR QL STRIP.AUTO: ABNORMAL
IMM GRANULOCYTES # BLD AUTO: 0.05 THOUSAND/UL (ref 0–0.2)
IMM GRANULOCYTES NFR BLD AUTO: 1 % (ref 0–2)
INR PPP: 0.92 (ref 0.85–1.19)
KETONES UR STRIP-MCNC: NEGATIVE MG/DL
LACTATE SERPL-SCNC: 1.1 MMOL/L (ref 0.5–2)
LEUKOCYTE ESTERASE UR QL STRIP: NEGATIVE
LYMPHOCYTES # BLD AUTO: 1.24 THOUSANDS/ΜL (ref 0.6–4.47)
LYMPHOCYTES NFR BLD AUTO: 13 % (ref 14–44)
MCH RBC QN AUTO: 29.6 PG (ref 26.8–34.3)
MCHC RBC AUTO-ENTMCNC: 34.9 G/DL (ref 31.4–37.4)
MCV RBC AUTO: 85 FL (ref 82–98)
MONOCYTES # BLD AUTO: 0.82 THOUSAND/ΜL (ref 0.17–1.22)
MONOCYTES NFR BLD AUTO: 8 % (ref 4–12)
NEUTROPHILS # BLD AUTO: 7.52 THOUSANDS/ΜL (ref 1.85–7.62)
NEUTS SEG NFR BLD AUTO: 77 % (ref 43–75)
NITRITE UR QL STRIP: NEGATIVE
NON-SQ EPI CELLS URNS QL MICRO: ABNORMAL /HPF
NRBC BLD AUTO-RTO: 0 /100 WBCS
PH UR STRIP.AUTO: 7 [PH]
PLATELET # BLD AUTO: 253 THOUSANDS/UL (ref 149–390)
PMV BLD AUTO: 9.9 FL (ref 8.9–12.7)
POTASSIUM SERPL-SCNC: 3.3 MMOL/L (ref 3.5–5.3)
PROT SERPL-MCNC: 6.9 G/DL (ref 6.4–8.4)
PROT UR STRIP-MCNC: NEGATIVE MG/DL
PROTHROMBIN TIME: 12.7 SECONDS (ref 12.3–15)
RBC # BLD AUTO: 5 MILLION/UL (ref 3.88–5.62)
RBC #/AREA URNS AUTO: ABNORMAL /HPF
SODIUM SERPL-SCNC: 139 MMOL/L (ref 135–147)
SP GR UR STRIP.AUTO: 1.01 (ref 1–1.03)
UROBILINOGEN UR QL STRIP.AUTO: 0.2 E.U./DL
WBC # BLD AUTO: 9.71 THOUSAND/UL (ref 4.31–10.16)
WBC #/AREA URNS AUTO: ABNORMAL /HPF

## 2025-01-22 PROCEDURE — 80053 COMPREHEN METABOLIC PANEL: CPT | Performed by: EMERGENCY MEDICINE

## 2025-01-22 PROCEDURE — 36415 COLL VENOUS BLD VENIPUNCTURE: CPT | Performed by: EMERGENCY MEDICINE

## 2025-01-22 PROCEDURE — 83605 ASSAY OF LACTIC ACID: CPT | Performed by: EMERGENCY MEDICINE

## 2025-01-22 PROCEDURE — 99223 1ST HOSP IP/OBS HIGH 75: CPT | Performed by: FAMILY MEDICINE

## 2025-01-22 PROCEDURE — 96375 TX/PRO/DX INJ NEW DRUG ADDON: CPT

## 2025-01-22 PROCEDURE — 99285 EMERGENCY DEPT VISIT HI MDM: CPT | Performed by: EMERGENCY MEDICINE

## 2025-01-22 PROCEDURE — 87040 BLOOD CULTURE FOR BACTERIA: CPT | Performed by: EMERGENCY MEDICINE

## 2025-01-22 PROCEDURE — 85025 COMPLETE CBC W/AUTO DIFF WBC: CPT | Performed by: EMERGENCY MEDICINE

## 2025-01-22 PROCEDURE — 96361 HYDRATE IV INFUSION ADD-ON: CPT

## 2025-01-22 PROCEDURE — 99284 EMERGENCY DEPT VISIT MOD MDM: CPT

## 2025-01-22 PROCEDURE — 96374 THER/PROPH/DIAG INJ IV PUSH: CPT

## 2025-01-22 PROCEDURE — 99222 1ST HOSP IP/OBS MODERATE 55: CPT | Performed by: UROLOGY

## 2025-01-22 PROCEDURE — 81001 URINALYSIS AUTO W/SCOPE: CPT | Performed by: EMERGENCY MEDICINE

## 2025-01-22 PROCEDURE — 85610 PROTHROMBIN TIME: CPT | Performed by: EMERGENCY MEDICINE

## 2025-01-22 PROCEDURE — 85730 THROMBOPLASTIN TIME PARTIAL: CPT | Performed by: EMERGENCY MEDICINE

## 2025-01-22 RX ORDER — TAMSULOSIN HYDROCHLORIDE 0.4 MG/1
0.4 CAPSULE ORAL
Status: DISCONTINUED | OUTPATIENT
Start: 2025-01-22 | End: 2025-01-24 | Stop reason: HOSPADM

## 2025-01-22 RX ORDER — OXYCODONE HYDROCHLORIDE 5 MG/1
2.5 TABLET ORAL EVERY 4 HOURS PRN
Status: DISCONTINUED | OUTPATIENT
Start: 2025-01-22 | End: 2025-01-24 | Stop reason: HOSPADM

## 2025-01-22 RX ORDER — ONDANSETRON 2 MG/ML
4 INJECTION INTRAMUSCULAR; INTRAVENOUS EVERY 6 HOURS PRN
Status: DISCONTINUED | OUTPATIENT
Start: 2025-01-22 | End: 2025-01-24 | Stop reason: HOSPADM

## 2025-01-22 RX ORDER — MORPHINE SULFATE 4 MG/ML
4 INJECTION, SOLUTION INTRAMUSCULAR; INTRAVENOUS
Status: DISCONTINUED | OUTPATIENT
Start: 2025-01-22 | End: 2025-01-22

## 2025-01-22 RX ORDER — POTASSIUM CHLORIDE 1500 MG/1
40 TABLET, EXTENDED RELEASE ORAL ONCE
Status: COMPLETED | OUTPATIENT
Start: 2025-01-22 | End: 2025-01-22

## 2025-01-22 RX ORDER — DOCUSATE SODIUM 100 MG/1
100 CAPSULE, LIQUID FILLED ORAL 2 TIMES DAILY
Status: DISCONTINUED | OUTPATIENT
Start: 2025-01-22 | End: 2025-01-24 | Stop reason: HOSPADM

## 2025-01-22 RX ORDER — KETOROLAC TROMETHAMINE 30 MG/ML
15 INJECTION, SOLUTION INTRAMUSCULAR; INTRAVENOUS ONCE
Status: COMPLETED | OUTPATIENT
Start: 2025-01-22 | End: 2025-01-22

## 2025-01-22 RX ORDER — SODIUM CHLORIDE, SODIUM GLUCONATE, SODIUM ACETATE, POTASSIUM CHLORIDE, MAGNESIUM CHLORIDE, SODIUM PHOSPHATE, DIBASIC, AND POTASSIUM PHOSPHATE .53; .5; .37; .037; .03; .012; .00082 G/100ML; G/100ML; G/100ML; G/100ML; G/100ML; G/100ML; G/100ML
125 INJECTION, SOLUTION INTRAVENOUS CONTINUOUS
Status: DISCONTINUED | OUTPATIENT
Start: 2025-01-22 | End: 2025-01-24 | Stop reason: HOSPADM

## 2025-01-22 RX ORDER — POLYETHYLENE GLYCOL 3350 17 G/17G
17 POWDER, FOR SOLUTION ORAL DAILY
Status: DISCONTINUED | OUTPATIENT
Start: 2025-01-22 | End: 2025-01-24 | Stop reason: HOSPADM

## 2025-01-22 RX ORDER — ACETAMINOPHEN 325 MG/1
650 TABLET ORAL EVERY 6 HOURS PRN
Status: DISCONTINUED | OUTPATIENT
Start: 2025-01-22 | End: 2025-01-24 | Stop reason: HOSPADM

## 2025-01-22 RX ORDER — HYDROMORPHONE HCL/PF 1 MG/ML
0.5 SYRINGE (ML) INJECTION EVERY 4 HOURS PRN
Status: DISCONTINUED | OUTPATIENT
Start: 2025-01-22 | End: 2025-01-24 | Stop reason: HOSPADM

## 2025-01-22 RX ORDER — ONDANSETRON 2 MG/ML
4 INJECTION INTRAMUSCULAR; INTRAVENOUS ONCE
Status: COMPLETED | OUTPATIENT
Start: 2025-01-22 | End: 2025-01-22

## 2025-01-22 RX ORDER — MORPHINE SULFATE 4 MG/ML
4 INJECTION, SOLUTION INTRAMUSCULAR; INTRAVENOUS ONCE
Status: COMPLETED | OUTPATIENT
Start: 2025-01-22 | End: 2025-01-22

## 2025-01-22 RX ORDER — CEFTRIAXONE 1 G/50ML
1000 INJECTION, SOLUTION INTRAVENOUS EVERY 24 HOURS
Status: DISCONTINUED | OUTPATIENT
Start: 2025-01-22 | End: 2025-01-24 | Stop reason: HOSPADM

## 2025-01-22 RX ADMIN — SODIUM CHLORIDE, SODIUM GLUCONATE, SODIUM ACETATE, POTASSIUM CHLORIDE, MAGNESIUM CHLORIDE, SODIUM PHOSPHATE, DIBASIC, AND POTASSIUM PHOSPHATE 125 ML/HR: .53; .5; .37; .037; .03; .012; .00082 INJECTION, SOLUTION INTRAVENOUS at 17:49

## 2025-01-22 RX ADMIN — MORPHINE SULFATE 4 MG: 4 INJECTION INTRAVENOUS at 06:19

## 2025-01-22 RX ADMIN — POTASSIUM CHLORIDE 40 MEQ: 1500 TABLET, EXTENDED RELEASE ORAL at 09:20

## 2025-01-22 RX ADMIN — SODIUM CHLORIDE 1000 ML: 0.9 INJECTION, SOLUTION INTRAVENOUS at 06:15

## 2025-01-22 RX ADMIN — OXYCODONE HYDROCHLORIDE 2.5 MG: 5 TABLET ORAL at 17:57

## 2025-01-22 RX ADMIN — ONDANSETRON 4 MG: 2 INJECTION INTRAMUSCULAR; INTRAVENOUS at 09:22

## 2025-01-22 RX ADMIN — KETOROLAC TROMETHAMINE 15 MG: 30 INJECTION, SOLUTION INTRAMUSCULAR; INTRAVENOUS at 06:19

## 2025-01-22 RX ADMIN — POLYETHYLENE GLYCOL 3350 17 G: 17 POWDER, FOR SOLUTION ORAL at 09:20

## 2025-01-22 RX ADMIN — DOCUSATE SODIUM 100 MG: 100 CAPSULE, LIQUID FILLED ORAL at 09:20

## 2025-01-22 RX ADMIN — HYDROMORPHONE HYDROCHLORIDE 0.5 MG: 1 INJECTION, SOLUTION INTRAMUSCULAR; INTRAVENOUS; SUBCUTANEOUS at 09:22

## 2025-01-22 RX ADMIN — ONDANSETRON 4 MG: 2 INJECTION INTRAMUSCULAR; INTRAVENOUS at 06:19

## 2025-01-22 RX ADMIN — SODIUM CHLORIDE, SODIUM GLUCONATE, SODIUM ACETATE, POTASSIUM CHLORIDE, MAGNESIUM CHLORIDE, SODIUM PHOSPHATE, DIBASIC, AND POTASSIUM PHOSPHATE 125 ML/HR: .53; .5; .37; .037; .03; .012; .00082 INJECTION, SOLUTION INTRAVENOUS at 09:57

## 2025-01-22 RX ADMIN — ONDANSETRON 4 MG: 2 INJECTION INTRAMUSCULAR; INTRAVENOUS at 15:22

## 2025-01-22 RX ADMIN — TAMSULOSIN HYDROCHLORIDE 0.4 MG: 0.4 CAPSULE ORAL at 15:27

## 2025-01-22 RX ADMIN — CEFTRIAXONE 1000 MG: 1 INJECTION, SOLUTION INTRAVENOUS at 09:25

## 2025-01-22 RX ADMIN — DOCUSATE SODIUM 100 MG: 100 CAPSULE, LIQUID FILLED ORAL at 17:58

## 2025-01-22 NOTE — ED CARE HANDOFF
Emergency Department Sign Out Note        Sign out and transfer of care from Dr. Mcmahon. See Separate Emergency Department note.     The patient, Denver Vera, was evaluated by the previous provider for flank pain.    Workup Completed:  Patient with known right-sided kidney stone.  7 mm proximal stone with obstruction.  Patient was seen on 1/20/2025.  Was diagnosed with same and urology recommended office follow-up.  Patient was scheduled for an appointment on February 21, 2025.  Patient was home for less than 48 hours and is presenting back to the emergency room with severe pain and severe nausea.    ED Course / Workup Pending (followup):  Patient will be hydrated, pain and nausea will be controlled.  Patient will be referred to urology for further definitive care.  Patient will be kept NPO.      Disposition  Final diagnoses:   Acute right flank pain   Vomiting   Intractable pain   Kidney stone on right side     Time reflects when diagnosis was documented in both MDM as applicable and the Disposition within this note       Time User Action Codes Description Comment    1/22/2025  6:21 AM Jared Mckeon [R10.9] Acute right flank pain     1/22/2025  6:22 AM Jared Mckeon [R11.10] Vomiting     1/22/2025  6:22 AM Jared Mckeon [R52] Intractable pain     1/22/2025  6:22 AM Jared Mckeon [N20.0] Kidney stone on right side           ED Disposition       ED Disposition   Admit    Condition   Stable    Date/Time   Wed Jan 22, 2025  6:15 AM    Comment                  Follow-up Information    None       Patient's Medications   Discharge Prescriptions    No medications on file     No discharge procedures on file.       ED Provider  Electronically Signed by     Jared Mckeon DO  01/22/25 0622

## 2025-01-22 NOTE — ASSESSMENT & PLAN NOTE
Plan as above    Discussed post operative course and follow up care after cystoscopy including stent management.  Options also discussed on mitigating pain post operatively including increasing hydration, Tylenol, heating pad as needed over the bladder.  As well as oxybutynin and Pyridium.  We also discussed discharge antibiotics, likely a 5 day course with Ceftin 500mg BID. Non pharmaceutical interventions like increased water intake and heating pad over bladder and flank also discussed.  All questions were answered, pt understands and agrees.  Still agreeable to surgery today.  Op follow up to be determined after intra-op examination and findings

## 2025-01-22 NOTE — PLAN OF CARE
Problem: PAIN - ADULT  Goal: Verbalizes/displays adequate comfort level or baseline comfort level  Description: Interventions:  - Encourage patient to monitor pain and request assistance  - Assess pain using appropriate pain scale  - Administer analgesics based on type and severity of pain and evaluate response  - Implement non-pharmacological measures as appropriate and evaluate response  - Consider cultural and social influences on pain and pain management  - Notify physician/advanced practitioner if interventions unsuccessful or patient reports new pain  Outcome: Progressing     Problem: INFECTION - ADULT  Goal: Absence or prevention of progression during hospitalization  Description: INTERVENTIONS:  - Assess and monitor for signs and symptoms of infection  - Monitor lab/diagnostic results  - Monitor all insertion sites, i.e. indwelling lines, tubes, and drains  - Monitor endotracheal if appropriate and nasal secretions for changes in amount and color  - [place] appropriate cooling/warming therapies per order  - Administer medications as ordered  - Instruct and encourage patient and family to use good hand hygiene technique  - Identify and instruct in appropriate isolation precautions for identified infection/condition  Outcome: Progressing  Goal: Absence of fever/infection during neutropenic period  Description: INTERVENTIONS:  - Monitor WBC    Outcome: Progressing     Problem: SAFETY ADULT  Goal: Patient will remain free of falls  Description: INTERVENTIONS:  - Educate patient/family on patient safety including physical limitations  - Instruct patient to call for assistance with activity   - Consult OT/PT to assist with strengthening/mobility   - Keep Call bell within reach  - Keep bed low and locked with side rails adjusted as appropriate  - Keep care items and personal belongings within reach  - Initiate and maintain comfort rounds  - Make Fall Risk Sign visible to staff  - Consider moving patient to room  near nurses station  Outcome: Progressing  Goal: Maintain or return to baseline ADL function  Description: INTERVENTIONS:  -  Assess patient's ability to carry out ADLs; assess patient's baseline for ADL function and identify physical deficits which impact ability to perform ADLs (bathing, care of mouth/teeth, toileting, grooming, dressing, etc.)  - Assess/evaluate cause of self-care deficits   - Assess range of motion  - Assess patient's mobility; develop plan if impaired  - Assess patient's need for assistive devices and provide as appropriate  - Encourage maximum independence but intervene and supervise when necessary  - Involve family in performance of ADLs  - Assess for home care needs following discharge   - Consider OT consult to assist with ADL evaluation and planning for discharge  - Provide patient education as appropriate  Outcome: Progressing  Goal: Maintains/Returns to pre admission functional level  Description: INTERVENTIONS:  - Perform AM-PAC 6 Click Basic Mobility/ Daily Activity assessment daily.  - Set and communicate daily mobility goal to care team and patient/family/caregiver.   - Collaborate with rehabilitation services on mobility goals if consulted  - Out of bed for meals 3 times a day  - Out of bed for toileting  - Record patient progress and toleration of activity level   Outcome: Progressing     Problem: DISCHARGE PLANNING  Goal: Discharge to home or other facility with appropriate resources  Description: INTERVENTIONS:  - Identify barriers to discharge w/patient and caregiver  - Arrange for needed discharge resources and transportation as appropriate  - Identify discharge learning needs (meds, wound care, etc.)  - Arrange for interpretive services to assist at discharge as needed  - Refer to Case Management Department for coordinating discharge planning if the patient needs post-hospital services based on physician/advanced practitioner order or complex needs related to functional  status, cognitive ability, or social support system  Outcome: Progressing     Problem: Knowledge Deficit  Goal: Patient/family/caregiver demonstrates understanding of disease process, treatment plan, medications, and discharge instructions  Description: Complete learning assessment and assess knowledge base.  Interventions:  - Provide teaching at level of understanding  - Provide teaching via preferred learning methods  Outcome: Progressing

## 2025-01-22 NOTE — H&P
H&P - Hospitalist   Name: Denver Vera 34 y.o. male I MRN: [id number]  Unit/Bed#: [facility]AL[facility] I Date of Admission: 1/22/2025   Date of Service: 1/22/2025 I Hospital Day: 0     Assessment & Plan  Right ureteral calculus  Imaging showing:. Obstructing stone in the proximal to mid right ureter measuring 7 mm, causing mild right hydroureteronephrosis. Asymmetrically enlarged right kidney demonstrating delayed enhancement, likely related to obstructive uropathy.     Continue pain medication, IV hydration, antibiotic support for prophylactic for surgical procedure  Discussed the case with urology, plan to proceed with urologic procedure on 1/23/2025  Right flank pain  Secondary to obstructive uropathy secondary to renal stone  Continue IV hydration, pain management,  Urology is planning to proceed with urologic procedure on 1/23/25-keep patient n.p.o. after midnight.  Left renal stone  Multiple nonobstructing left-sided renal stones are visualized measuring up to 8 mm in the left lower pole. No obstructing renal stone. No left-sided hydronephrosis.     Continue pain medication IV hydration,  Appreciate urology recommendation.  Hypokalemia  Same 3.3, will supplement, recheck      VTE Pharmacologic Prophylaxis: VTE Score: 1 Low Risk (Score 0-2) - Encourage Ambulation.  Code Status: Level 1 - Full Code per patient  Discussion with family:  With patient.     Anticipated Length of Stay: Patient will be admitted on an inpatient basis with an anticipated length of stay of greater than 2 midnights secondary to monitorable conditions.    History of Present Illness   Chief Complaint: Flank pain    Denver Vera is a 34 y.o. male with a PMH of Gilbert's syndrome who presents with right flank pain, sharp in nature, radiates towards groin.  Mild urine color change.  Associated with nausea, mild chills..  Had 1 episode of vomiting after coming to floor.  Per patient, he recently visited this ER 2 days ago discharged back with pain medication  and doing well, but then this morning the pain came suddenly, sharp in nature, located in the right flank.    Review of Systems   Constitutional:  Positive for activity change, appetite change and chills. Negative for diaphoresis, fatigue, fever and unexpected weight change.   Respiratory:  Negative for apnea, cough, choking and chest tightness.    Cardiovascular:  Negative for chest pain, palpitations and leg swelling.   Gastrointestinal:  Negative for abdominal distention, constipation, diarrhea, nausea and vomiting.   Genitourinary:  Positive for dysuria, flank pain and frequency. Negative for difficulty urinating.   Skin:  Negative for color change and wound.   Neurological:  Negative for dizziness, facial asymmetry, light-headedness and headaches.   All other systems reviewed and are negative.      Historical Information   Past Medical History:   Diagnosis Date    Back pain     Gilbert syndrome     Lumbar herniated disc     Motion sickness      Past Surgical History:   Procedure Laterality Date    LIPOMA RESECTION Bilateral 12/9/2024    Procedure: EXCISION LIPOMAS OF RIGHT BUTTOCK, RT UPPER THIGH AND LEFT GROIN;  Surgeon: Le Castillo DO;  Location: [facility];  Service: General    LIPOMA RESECTION Bilateral 12/31/2024    Procedure: EXCISION LIPOMA OF RIGHT POSTERIOR THIGH LIPOMA X3, EXCISION OF LEFT FOREARM LIPOMA;  Surgeon: Le Castillo DO;  Location: [facility];  Service: General    REPAIR LABRUM Bilateral     WISDOM TOOTH EXTRACTION       Social History     Tobacco Use    Smoking status: Never    Smokeless tobacco: Never   Vaping Use    Vaping status: Some Days    Substances: THC   Substance and Sexual Activity    Alcohol use: Yes     Comment: socially    Drug use: Yes     Types: Marijuana     Comment: medical    Sexual activity: Not on file     E-Cigarette/Vaping    E-Cigarette Use Current Some Day User      E-Cigarette/Vaping Substances    Nicotine No     THC Yes     CBD No      Flavoring No      History reviewed. No pertinent family history.  Social History:  Marital Status: /Civil Union   Occupation: Employed  Patient Pre-hospital Living Situation: Home  Patient Pre-hospital Level of Mobility: walks  Patient Pre-hospital Diet Restrictions: If condition get worse    Meds/Allergies   I have reviewed home medications with patient personally.  Prior to Admission medications    Medication Sig Start Date End Date Taking? Authorizing Provider   Ascorbic Acid (VITAMIN C PO) Take by mouth    Historical Provider, MD   B Complex Vitamins (B COMPLEX PO) Take 1 tablet by mouth daily    Historical Provider, MD   Cyanocobalamin (VITAMIN B-12 PO) Take by mouth    Historical Provider, MD   naproxen (Naprosyn) 500 mg tablet Take 1 tablet (500 mg total) by mouth 2 (two) times a day with meals 1/20/25   Mathieu Berry DO   Omega-3 Fatty Acids (FISH OIL PO) Take by mouth    Historical Provider, MD   tamsulosin (FLOMAX) 0.4 mg Take 1 capsule (0.4 mg total) by mouth daily with dinner 1/20/25   Mathieu Berry DO   VITAMIN D PO Take by mouth    Historical Provider, MD   Vitamin E 200 units TABS Take by mouth    Historical Provider, MD     Allergies   Allergen Reactions    Gabapentin Nausea Only    Amoxicillin Rash       Objective :  Temp:  [99.1 °F (37.3 °C)] 99.1 °F (37.3 °C)  HR:  [81-88] 81  BP: (126-169)/(67-92) 126/67  Resp:  [16-18] 16  SpO2:  [95 %-97 %] 96 %  O2 Device: None (Room air)    Physical Exam  Vitals and nursing note reviewed.   Constitutional:       Appearance: He is not ill-appearing or diaphoretic.   HENT:      Mouth/Throat:      Mouth: Mucous membranes are moist.      Pharynx: No oropharyngeal exudate or posterior oropharyngeal erythema.   Eyes:      General: No scleral icterus.        Left eye: No discharge.      Extraocular Movements: Extraocular movements intact.      Conjunctiva/sclera: Conjunctivae normal.      Pupils: Pupils are equal, round, and  "reactive to light.   Cardiovascular:      Rate and Rhythm: Normal rate.      Heart sounds: No murmur heard.     No friction rub. No gallop.   Pulmonary:      Effort: No respiratory distress.      Breath sounds: No stridor. No wheezing or rhonchi.   Abdominal:      General: There is no distension.      Palpations: There is no mass.      Tenderness: There is no abdominal tenderness. There is right CVA tenderness.      Hernia: No hernia is present.   Musculoskeletal:      Right lower leg: No edema.      Left lower leg: No edema.   Neurological:      Mental Status: He is alert and oriented to person, place, and time.      Cranial Nerves: No cranial nerve deficit.      Sensory: No sensory deficit.      Motor: No weakness.          Lines/Drains:            Lab Results: I have reviewed the following results:  Results from last 7 days   Lab Units 01/22/25  0613   WBC Thousand/uL 9.71   HEMOGLOBIN g/dL 14.8   HEMATOCRIT % 42.4   PLATELETS Thousands/uL 253   SEGS PCT % 77*   LYMPHO PCT % 13*   MONO PCT % 8   EOS PCT % 1     Results from last 7 days   Lab Units 01/22/25  0613   SODIUM mmol/L 139   POTASSIUM mmol/L 3.3*   CHLORIDE mmol/L 103   CO2 mmol/L 29   BUN mg/dL 16   CREATININE mg/dL 0.78   ANION GAP mmol/L 7   CALCIUM mg/dL 8.5   ALBUMIN g/dL 4.4   TOTAL BILIRUBIN mg/dL 1.08*   ALK PHOS U/L 74   ALT U/L 18   AST U/L 13   GLUCOSE RANDOM mg/dL 117     Results from last 7 days   Lab Units 01/22/25  0613   INR  0.92         No results found for: \"HGBA1C\"  Results from last 7 days   Lab Units 01/22/25  0613   LACTIC ACID mmol/L 1.1       Imaging Results Review: I reviewed radiology reports from this admission including: CT abdomen/pelvis.  Other Study Results Review: No additional pertinent studies reviewed.    Administrative Statements       ** Please Note: This note has been constructed using a voice recognition system. **    "

## 2025-01-22 NOTE — CONSULTS
Inpatient consult to Urology  Consult performed by: SABRINA Collins  Consult ordered by: Jared Mckeon, DO      : UROLOGY  Denver Vera 34 y.o. male [id number]   Unit/Bed #: ED 12  Encounter: [id number]        Assessment  & Plan  :    Right ureteral calculus  Assessment & Plan  Seen 1/20/25 found to have 7mm right proximal to mid ureteral calculus  Returned to ED less than 48 hours with severe persistent flank pain, nausea, vomiting  Nausea, vomiting, pain controlled  Continue IV hydration, anti emetics, pain control  Strain all urine,  Flomax 0.4mg daily  NPO at midnight  Plan: cystoscopy, right retrograde pyelogram, laser lithotripsy, possible basket stone extraction, right ureteral stent insertion for 1/23/25. Informed consent signed and placed in ED chart.  Please reach out to urology on call if any change in vital signs or acute decompensation, may need transfer for more urgent intervention      Right flank pain  Assessment & Plan  Plan as above       Patient counseled on RISK of stone surgery- bleeding, urinary/blood stream infection, worsening pain, current pain not related to stone and no better after surgery, the need for anesthesia and potential for stent colic/prolonged stenting postop, or unable to complete procedure if stone not accessible given location.  He understands there could be a need for percutaneous approach and nephrostomy drainage bags.  Other risks include need for repeated procedures and alternative procedure. Options also discussed on mitigating pain post operatively including increasing hydration, Tylenol, heating pad as needed over the bladder.  As well as oxybutynin and Pyridium if creatinine allows. Since he has returned to Ed after discharge with in 48 hours and continues to endorse pain of 4/10 after Morphine and ketorolac, he is interested in proceeding with surgical intervention.      Recommend continue anti emetics, pain medications, flomax, IV hydration, strain all  urine.  Continue to monitor vital signs, trend labs and supportive care.  Plan for cystoscopy, right retrograde pyelogram, laser lithotripsy, possible basket stone extraction, right ureteral stent insertion for 1/23/25.  All questions were answered he understands and agrees with the plan.  Please reach out to urology on call if any change in vital signs or acute decompensation, may need transfer for more urgent intervention          Subjective :    Denver Vera  is a 34 y.o. male patient with known right-sided kidney stone.Seen in ED 1/20/25 and was found to have 7 mm proximal stone with obstruction.  Returned today due to severe, persistent right flank pain along with nausea and vomiting. Endorses prior smaller kidney stone, unsure laterality, which passed without intervention.  Primary beverage of choice is iced tea. Endorses awareness of his need to work to eliminate this from his diet.  He is afebrile, no leukocytosis, creatinine 0.75, vitals signs stable. Of note, arrival temp 99.1 obtained with temporal probe while patient wearing winter hat.  He is voiding well, no hematuria, dysuria, hesitancy, urgency.  Was treated with morphine, Zofran, IV fluids and ketorolac Pain at 4/10,  nausea, vomiting now controlled. Urine not collected,  no antibiosis, Encouragement given for urine sample for analysis.                  Allergies   Allergen Reactions    Gabapentin Nausea Only    Amoxicillin Rash      Current Outpatient Medications   Medication Instructions    Ascorbic Acid (VITAMIN C PO) Take by mouth    B Complex Vitamins (B COMPLEX PO) 1 tablet, Daily    Cyanocobalamin (VITAMIN B-12 PO) Take by mouth    naproxen (NAPROSYN) 500 mg, Oral, 2 times daily with meals    Omega-3 Fatty Acids (FISH OIL PO) Take by mouth    tamsulosin (FLOMAX) 0.4 mg, Oral, Daily with dinner    VITAMIN D PO Take by mouth    Vitamin E 200 units TABS Take by mouth      Past Medical History:   Diagnosis Date    Back pain     Gilbert syndrome      Lumbar herniated disc     Motion sickness      Past Surgical History:   Procedure Laterality Date    LIPOMA RESECTION Bilateral 12/9/2024    Procedure: EXCISION LIPOMAS OF RIGHT BUTTOCK, RT UPPER THIGH AND LEFT GROIN;  Surgeon: Le Castillo DO;  Location: [facility];  Service: General    LIPOMA RESECTION Bilateral 12/31/2024    Procedure: EXCISION LIPOMA OF RIGHT POSTERIOR THIGH LIPOMA X3, EXCISION OF LEFT FOREARM LIPOMA;  Surgeon: Le Castillo DO;  Location: [facility];  Service: General    REPAIR LABRUM Bilateral     WISDOM TOOTH EXTRACTION       History reviewed. No pertinent family history.  Social History     Socioeconomic History    Marital status: /Civil Union     Spouse name: None    Number of children: None    Years of education: None    Highest education level: None   Occupational History    None   Tobacco Use    Smoking status: Never    Smokeless tobacco: Never   Vaping Use    Vaping status: Some Days    Substances: THC   Substance and Sexual Activity    Alcohol use: Yes     Comment: socially    Drug use: Yes     Types: Marijuana     Comment: medical    Sexual activity: None   Other Topics Concern    None   Social History Narrative    None     Social Drivers of Health     Financial Resource Strain: Low Risk  (10/28/2024)    Received from [facility]    Financial Resource Strain     Do you have any trouble paying for your medications, or do you think you might in the future?: No     Does your family have trouble paying for medicine? (Household - for ages 0-17 years): Not on file   Food Insecurity: No Food Insecurity (10/28/2024)    Received from [facility]    Hunger Vital Sign     Worried About Running Out of Food in the Last Year: Never true     Ran Out of Food in the Last Year: Never true   Transportation Needs: No Transportation Needs (10/28/2024)    Received from [facility]    Transportation Needs     Do you have trouble getting a ride to medical visits or work? (Adult -  for ages 18 years and over): Not on file     Does your family have a hard time getting a ride to doctors’ visits? (Household - for ages 0-17 years): Not on file     Has lack of transportation kept you from medical appointments, meetings, work, or from getting things needed for daily living? Check all that apply.: No     Do you (or your family) have trouble finding or paying for a ride (transportation)? (Household - for ages 0-17 years): Not on file   Physical Activity: Not on file   Stress: Not on file   Social Connections: Socially Integrated (10/28/2024)    Received from [facility]     How often do you feel lonely or isolated from those around you?: Never   Intimate Partner Violence: Not on file   Housing Stability: Low Risk  (10/28/2024)    Received from [email or web address]     Do you currently live in a shelter or have no steady place to sleep at night?: No     Do you think you are at risk of becoming homeless? (Adult - for ages 18 years and over): Not on file     Does your family worry about paying for your home or becoming homeless? (Household - for ages 0-17 years): Not on file     Are you homeless or worried that you might be in the future?: No     Are you (or your family) homeless or worried that you might be in the future? (Household - for ages 0-17 years): Not on file        Review of Systems   Constitutional:  Negative for activity change, chills and fever.   HENT: Negative.     Eyes: Negative.    Respiratory:  Negative for apnea and shortness of breath.    Gastrointestinal:  Negative for abdominal distention, abdominal pain, nausea and vomiting.   Endocrine: Negative.    Genitourinary:  Positive for flank pain (right). Negative for difficulty urinating, dysuria, hematuria, penile pain and urgency.   Musculoskeletal:  Negative for arthralgias and neck pain.   Skin: Negative.    Allergic/Immunologic: Negative.    Neurological:  Negative for dizziness and headaches.    Hematological: Negative.    Psychiatric/Behavioral: Negative.  Negative for agitation and behavioral problems.         Objective     Physical Exam  Vitals and nursing note reviewed.   Constitutional:       General: He is not in acute distress.     Appearance: Normal appearance. He is well-developed. He is not toxic-appearing or diaphoretic.   HENT:      Head: Normocephalic and atraumatic.      Right Ear: External ear normal.      Left Ear: External ear normal.      Nose: Nose normal.      Mouth/Throat:      Pharynx: Uvula midline.   Eyes:      Extraocular Movements: Extraocular movements intact.      Pupils: Pupils are equal, round, and reactive to light.   Cardiovascular:      Rate and Rhythm: Normal rate.      Heart sounds: No murmur heard.  Pulmonary:      Effort: Pulmonary effort is normal. No respiratory distress.   Abdominal:      General: Bowel sounds are normal.      Palpations: Abdomen is soft.      Tenderness: There is no abdominal tenderness. There is no right CVA tenderness, left CVA tenderness or guarding.   Genitourinary:     Penis: Normal.       Testes: Normal.   Musculoskeletal:         General: No tenderness. Normal range of motion.      Cervical back: Normal range of motion and neck supple.   Skin:     General: Skin is warm and dry.      Findings: No rash.   Neurological:      General: No focal deficit present.      Mental Status: He is alert and oriented to person, place, and time. Mental status is at baseline.      GCS: GCS eye subscore is 4. GCS verbal subscore is 5. GCS motor subscore is 6.   Psychiatric:         Mood and Affect: Mood normal.         Behavior: Behavior normal. Behavior is cooperative.         Thought Content: Thought content normal.         Judgment: Judgment normal.                Imaging:    CT ABDOMEN AND PELVIS WITH IV CONTRAST     INDICATION: RLQ abdominal pain. .     COMPARISON: CTs performed on 9/16/2021, 9/2/2021     TECHNIQUE: CT examination of the abdomen and pelvis  was performed. Multiplanar 2D reformatted images were created from the source data.     This examination, like all CT scans performed in the [facility], was performed utilizing techniques to minimize radiation dose exposure, including the use of iterative reconstruction and automated exposure control. Radiation dose length   product (DLP) for this visit: 975 mGy-cm     IV Contrast: 75 mL of iohexol (OMNIPAQUE)  Enteric Contrast: Not administered.     FINDINGS:     ABDOMEN     LOWER CHEST: Bibasilar atelectasis.     LIVER/BILIARY TREE: Unremarkable.     GALLBLADDER: No calcified gallstones. No pericholecystic inflammatory change.     SPLEEN: Unremarkable.     PANCREAS: Unremarkable.     ADRENAL GLANDS: Unremarkable.     KIDNEYS/URETERS: The right kidney is asymmetrically enlarged and demonstrates delayed enhancement compared to the left kidney. There is mild left hydronephrosis and proximal hydroureter. There is an obstructing 7 mm stone in the proximal to mid right   ureter (series 601 image 86, series 301 image 100). There is mild urothelial enhancement and stranding surrounding the right proximal to mid ureter.     Multiple additional nonobstructing right-sided renal stones are visualized measuring up to 6 mm in the right lower pole.     Multiple nonobstructing left-sided renal stones are visualized measuring up to 8 mm in the left lower pole. No obstructing renal stone. No left-sided hydronephrosis.     STOMACH AND BOWEL: Small hiatal hernia. Mild wall thickening of the transverse colon, which may reflect mild colitis.     APPENDIX: No findings to suggest appendicitis.     ABDOMINOPELVIC CAVITY: No ascites. No pneumoperitoneum. No lymphadenopathy.     VESSELS: Unremarkable for patient's age.     PELVIS     REPRODUCTIVE ORGANS: Scattered prostatic calcifications.     URINARY BLADDER: Unremarkable.     ABDOMINAL WALL/INGUINAL REGIONS: Small fat-containing umbilical hernia. Focal fat stranding on  the right anterior thigh with associated skin thickening.     BONES: No acute fracture or suspicious osseous lesion.     IMPRESSION:        1. Obstructing stone in the proximal to mid right ureter measuring 7 mm, causing mild right hydroureteronephrosis. Asymmetrically enlarged right kidney demonstrating delayed enhancement, likely related to obstructive uropathy.     2. Mild urothelial enhancement and fat stranding surrounding the proximal to mid ureter. Superimposed infectious/inflammatory ureteritis is not excluded.     3. Multiple additional bilateral nonobstructing renal stones as described.     4. Mild wall thickening of the transverse colon, which may reflect mild colitis.     5. Additional findings as described.           The study was marked in EPIC for immediate notification..        Workstation performed: [id number]        Imaging    CT abdomen pelvis with contrast (Order: [id number]) - 1/20/2025  Labs:  Lab Results   Component Value Date    SODIUM 139 01/22/2025    K 3.3 (L) 01/22/2025     01/22/2025    CO2 29 01/22/2025    BUN 16 01/22/2025    CREATININE 0.78 01/22/2025    GLUC 117 01/22/2025    CALCIUM 8.5 01/22/2025         Lab Results   Component Value Date    WBC 9.71 01/22/2025    HGB 14.8 01/22/2025    HCT 42.4 01/22/2025    MCV 85 01/22/2025     01/22/2025         VTE Pharmacologic Prophylaxis: VTE covered by:    None      VTE Mechanical Prophylaxis: reason for no mechanical VTE prophylaxis   ambulatory    SABRINA Collins

## 2025-01-22 NOTE — LETTER
[facility]  [address] [address]  Dept: [phone]    January 24, 2025     Patient: Denver Vera   YOB: 1990   Date of Visit: 1/22/2025       To Whom it May Concern:    Denver Vera is under my professional care. He was seen in the hospital from 1/22/2025 to 01/24/25. He may return to work on 1/27/25 with the following limitations ten pound weight lifting restriction until follow up appointment, tentatively scheduled for 2/7/25 .    If you have any questions or concerns, please don't hesitate to call.         Sincerely,          Allan Joe MD

## 2025-01-22 NOTE — ASSESSMENT & PLAN NOTE
Secondary to obstructive uropathy secondary to renal stone  Continue IV hydration, pain management,  Urology is planning to proceed with urologic procedure on 1/23/25-keep patient n.p.o. after midnight.

## 2025-01-22 NOTE — ANESTHESIA PREPROCEDURE EVALUATION
"Procedure:  CYSTOSCOPY URETEROSCOPY WITH LITHOTRIPSY HOLMIUM LASER, RETROGRADE PYELOGRAM AND INSERTION STENT URETERAL (Right: Ureter)    Relevant Problems   ANESTHESIA   (+) Motion sickness      CARDIO (within normal limits)      ENDO (within normal limits)      GI/HEPATIC (within normal limits)      /RENAL   (+) Left renal stone      HEMATOLOGY (within normal limits)      NEURO/PSYCH (within normal limits)      Behavioral Health   (+) Marijuana smoker      Urinary   (+) Right ureteral calculus      Other Pediatrics   (+) Gilbert syndrome      Lab Results   Component Value Date    WBC 9.71 01/22/2025    HGB 14.8 01/22/2025    HCT 42.4 01/22/2025    MCV 85 01/22/2025     01/22/2025     Lab Results   Component Value Date    SODIUM 139 01/22/2025    K 3.3 (L) 01/22/2025     01/22/2025    CO2 29 01/22/2025    BUN 16 01/22/2025    CREATININE 0.78 01/22/2025    GLUC 117 01/22/2025    CALCIUM 8.5 01/22/2025     Lab Results   Component Value Date    INR 0.92 01/22/2025    PROTIME 12.7 01/22/2025     No results found for: \"HGBA1C\"         Physical Exam    Airway    Mallampati score: II  TM Distance: >3 FB  Neck ROM: full     Dental   No notable dental hx     Cardiovascular  Cardiovascular exam normal    Pulmonary  Pulmonary exam normal     Other Findings      Anesthesia Plan  ASA Score- 2     Anesthesia Type- general with ASA Monitors.         Additional Monitors:     Airway Plan: LMA.    Comment: Discussed with patient plan for anesthesia, as well as risks/benefits, including likely chance of PONV and sore throat, as well as rare possibilities of dental/oropharyngeal/ocular injuries, aspiration, and severe/life-threatening surgical and anesthetic emergencies.  Patient expressed understanding and agreement.  .       Plan Factors-Exercise tolerance (METS): >4 METS.    Chart reviewed.   Existing labs reviewed. Patient summary reviewed.                  Induction- intravenous.    Postoperative Plan- "     Perioperative Resuscitation Plan - Level 1 - Full Code.       Informed Consent- Anesthetic plan and risks discussed with patient.  I personally reviewed this patient with the CRNA. Discussed and agreed on the Anesthesia Plan with the CRNA..      NPO Status:  No vitals data found for the desired time range.

## 2025-01-22 NOTE — ASSESSMENT & PLAN NOTE
Known 7mm right proximal to mid ureteral calculus, failed MET  Pain controlled with analgesics overnight  Continue IV hydration, anti emetics, pain control  Strain all urine  Flomax 0.4mg daily  Plan: cystoscopy, right retrograde pyelogram, laser lithotripsy, possible basket stone extraction, right ureteral stent insertion   OP urology follow up

## 2025-01-22 NOTE — ASSESSMENT & PLAN NOTE
Multiple nonobstructing left-sided renal stones are visualized measuring up to 8 mm in the left lower pole. No obstructing renal stone. No left-sided hydronephrosis.     Continue pain medication IV hydration,  Appreciate urology recommendation.

## 2025-01-22 NOTE — Clinical Note
[facility] MED SURG UNIT  [address] [address]  Dept: [phone]    January 24, 2025     Patient: Denver Vera   YOB: 1990   Date of Visit: 1/22/2025       To Whom it May Concern:    Denver Vera is under my professional care. He was seen in the hospital from 1/22/2025 to 01/24/25. He {Return to school/sport/work:19456}.    If you have any questions or concerns, please don't hesitate to call.         Sincerely,          SABRINA Collins

## 2025-01-22 NOTE — ED PROVIDER NOTES
ED Disposition       None          Assessment & Plan       Medical Decision Making  Amount and/or Complexity of Data Reviewed  Labs: ordered.    Risk  Prescription drug management.        ED Course as of 01/22/25 0609   Wed Jan 22, 2025   0608 Case discussed and care transferred to Dr. Mckeon pending lab and urine results and reevaluation for response to IV medications and disposition.         Medications   sodium chloride 0.9 % bolus 1,000 mL (has no administration in time range)   morphine injection 4 mg (has no administration in time range)   ketorolac (TORADOL) injection 15 mg (has no administration in time range)   ondansetron (ZOFRAN) injection 4 mg (has no administration in time range)       ED Risk Strat Scores                          SBIRT 20yo+      Flowsheet Row Most Recent Value   Initial Alcohol Screen: US AUDIT-C     1. How often do you have a drink containing alcohol? 0 Filed at: 01/22/2025 0547   2. How many drinks containing alcohol do you have on a typical day you are drinking?  0 Filed at: 01/22/2025 0547   3a. Male UNDER 65: How often do you have five or more drinks on one occasion? 0 Filed at: 01/22/2025 0547   Audit-C Score 0 Filed at: 01/22/2025 0547   NITO: How many times in the past year have you...    Used an illegal drug or used a prescription medication for non-medical reasons? Never Filed at: 01/22/2025 0547                            History of Present Illness       Chief Complaint   Patient presents with    Flank Pain     Pt was seen here on Monday, states he has a 7mm kidney stone. Reporting inc right flank pain that moves into his back. Also reporting N/V. Denies fevers. Took naproxen at 0430, provided some relief.       Past Medical History:   Diagnosis Date    Back pain     Gilbert syndrome     Lumbar herniated disc     Motion sickness       Past Surgical History:   Procedure Laterality Date    LIPOMA RESECTION Bilateral 12/9/2024    Procedure: EXCISION LIPOMAS OF RIGHT  BUTTOCK, RT UPPER THIGH AND LEFT GROIN;  Surgeon: Le Castillo DO;  Location: [facility];  Service: General    LIPOMA RESECTION Bilateral 12/31/2024    Procedure: EXCISION LIPOMA OF RIGHT POSTERIOR THIGH LIPOMA X3, EXCISION OF LEFT FOREARM LIPOMA;  Surgeon: Le Castillo DO;  Location: [facility];  Service: General    REPAIR LABRUM Bilateral     WISDOM TOOTH EXTRACTION        History reviewed. No pertinent family history.   Social History     Tobacco Use    Smoking status: Never    Smokeless tobacco: Never   Vaping Use    Vaping status: Some Days    Substances: THC   Substance Use Topics    Alcohol use: Yes     Comment: socially    Drug use: Yes     Types: Marijuana     Comment: medical      E-Cigarette/Vaping    E-Cigarette Use Current Some Day User       E-Cigarette/Vaping Substances    Nicotine No     THC Yes     CBD No     Flavoring No       I have reviewed and agree with the history as documented.     Patient is a 34-year-old male history of gallbladder syndrome recently diagnosed with 7 mm right ureterolithiasis and hydronephrosis was started on Flomax and naproxen.  Patient reports pain worsened this morning associate with nausea despite taking pain meds denies fevers no vomiting.        History provided by:  Patient  Flank Pain  Pain location:  R flank  Pain quality: aching    Associated symptoms: nausea    Associated symptoms: no chest pain, no fever, no shortness of breath and no vomiting        Review of Systems   Constitutional:  Negative for fever.   Respiratory:  Negative for shortness of breath.    Cardiovascular:  Negative for chest pain.   Gastrointestinal:  Positive for nausea. Negative for abdominal pain and vomiting.   Genitourinary:  Positive for flank pain.   All other systems reviewed and are negative.          Objective       ED Triage Vitals [01/22/25 0548]   Temperature Pulse Blood Pressure Respirations SpO2 Patient Position - Orthostatic VS   99.1 °F (37.3 °C) 88  169/92 18 97 % Sitting      Temp Source Heart Rate Source BP Location FiO2 (%) Pain Score    Temporal Monitor Right arm -- --      Vitals      Date and Time Temp Pulse SpO2 Resp BP Pain Score FACES Pain Rating User   01/22/25 0548 99.1 °F (37.3 °C) 88 97 % 18 169/92 -- -- AR            Physical Exam  Vitals and nursing note reviewed.   Constitutional:       General: He is not in acute distress.     Appearance: Normal appearance.   HENT:      Head: Normocephalic and atraumatic.      Nose: Nose normal.   Eyes:      Conjunctiva/sclera: Conjunctivae normal.   Pulmonary:      Effort: Pulmonary effort is normal. No respiratory distress.   Abdominal:      Palpations: Abdomen is soft.      Tenderness: There is no abdominal tenderness. There is right CVA tenderness. There is no guarding or rebound.   Skin:     General: Skin is dry.   Neurological:      General: No focal deficit present.      Mental Status: He is alert and oriented to person, place, and time.         Results Reviewed       Procedure Component Value Units Date/Time    Blood culture #1 [[id number]]     Lab Status: No result Specimen: Blood     Blood culture #2 [[id number]]     Lab Status: No result Specimen: Blood     Lactic acid, plasma (w/reflex if result > 2.0) [[id number]]     Lab Status: No result Specimen: Blood     UA w Reflex to Microscopic w Reflex to Culture [[id number]]     Lab Status: No result Specimen: Urine     CBC and differential [[id number]]     Lab Status: No result Specimen: Blood     Protime-INR [[id number]]     Lab Status: No result Specimen: Blood     APTT [[id number]]     Lab Status: No result Specimen: Blood     Comprehensive metabolic panel [[id number]]     Lab Status: No result Specimen: Blood             No orders to display     CT abdomen pelvis with contrast  Result Date: 1/20/2025  Narrative: CT ABDOMEN AND PELVIS WITH IV CONTRAST INDICATION: RLQ abdominal pain. . COMPARISON: CTs performed on 9/16/2021, 9/2/2021 TECHNIQUE: CT examination  of the abdomen and pelvis was performed. Multiplanar 2D reformatted images were created from the source data. This examination, like all CT scans performed in the [facility], was performed utilizing techniques to minimize radiation dose exposure, including the use of iterative reconstruction and automated exposure control. Radiation dose length product (DLP) for this visit: 975 mGy-cm IV Contrast: 75 mL of iohexol (OMNIPAQUE) Enteric Contrast: Not administered. FINDINGS: ABDOMEN LOWER CHEST: Bibasilar atelectasis. LIVER/BILIARY TREE: Unremarkable. GALLBLADDER: No calcified gallstones. No pericholecystic inflammatory change. SPLEEN: Unremarkable. PANCREAS: Unremarkable. ADRENAL GLANDS: Unremarkable. KIDNEYS/URETERS: The right kidney is asymmetrically enlarged and demonstrates delayed enhancement compared to the left kidney. There is mild left hydronephrosis and proximal hydroureter. There is an obstructing 7 mm stone in the proximal to mid right ureter (series 601 image 86, series 301 image 100). There is mild urothelial enhancement and stranding surrounding the right proximal to mid ureter. Multiple additional nonobstructing right-sided renal stones are visualized measuring up to 6 mm in the right lower pole. Multiple nonobstructing left-sided renal stones are visualized measuring up to 8 mm in the left lower pole. No obstructing renal stone. No left-sided hydronephrosis. STOMACH AND BOWEL: Small hiatal hernia. Mild wall thickening of the transverse colon, which may reflect mild colitis. APPENDIX: No findings to suggest appendicitis. ABDOMINOPELVIC CAVITY: No ascites. No pneumoperitoneum. No lymphadenopathy. VESSELS: Unremarkable for patient's age. PELVIS REPRODUCTIVE ORGANS: Scattered prostatic calcifications. URINARY BLADDER: Unremarkable. ABDOMINAL WALL/INGUINAL REGIONS: Small fat-containing umbilical hernia. Focal fat stranding on the right anterior thigh with associated skin thickening. BONES:  No acute fracture or suspicious osseous lesion.     Impression: 1. Obstructing stone in the proximal to mid right ureter measuring 7 mm, causing mild right hydroureteronephrosis. Asymmetrically enlarged right kidney demonstrating delayed enhancement, likely related to obstructive uropathy. 2. Mild urothelial enhancement and fat stranding surrounding the proximal to mid ureter. Superimposed infectious/inflammatory ureteritis is not excluded. 3. Multiple additional bilateral nonobstructing renal stones as described. 4. Mild wall thickening of the transverse colon, which may reflect mild colitis. 5. Additional findings as described. The study was marked in EPIC for immediate notification.. Workstation performed: [id number]         Procedures    ED Medication and Procedure Management   Prior to Admission Medications   Prescriptions Last Dose Informant Patient Reported? Taking?   Ascorbic Acid (VITAMIN C PO)   Yes No   Sig: Take by mouth   B Complex Vitamins (B COMPLEX PO)   Yes No   Sig: Take 1 tablet by mouth daily   Cyanocobalamin (VITAMIN B-12 PO)   Yes No   Sig: Take by mouth   Omega-3 Fatty Acids (FISH OIL PO)   Yes No   Sig: Take by mouth   VITAMIN D PO   Yes No   Sig: Take by mouth   Vitamin E 200 units TABS   Yes No   Sig: Take by mouth   naproxen (Naprosyn) 500 mg tablet   No No   Sig: Take 1 tablet (500 mg total) by mouth 2 (two) times a day with meals   tamsulosin (FLOMAX) 0.4 mg   No No   Sig: Take 1 capsule (0.4 mg total) by mouth daily with dinner      Facility-Administered Medications: None     Patient's Medications   Discharge Prescriptions    No medications on file     No discharge procedures on file.  ED SEPSIS DOCUMENTATION            Ham Mcmahon DO  01/22/25 0636

## 2025-01-22 NOTE — ASSESSMENT & PLAN NOTE
Imaging showing:. Obstructing stone in the proximal to mid right ureter measuring 7 mm, causing mild right hydroureteronephrosis. Asymmetrically enlarged right kidney demonstrating delayed enhancement, likely related to obstructive uropathy.     Continue pain medication, IV hydration, antibiotic support for prophylactic for surgical procedure  Discussed the case with urology, plan to proceed with urologic procedure on 1/23/2025

## 2025-01-22 NOTE — LETTER
[facility] MED SURG UN[facility]  [address] [address]  Dept: [phone]    January 24, 2025     Patient: Denver Vera   YOB: 1990   Date of Visit: 1/22/2025       To Whom it May Concern:    Denver Vera is under my professional care. He was seen in the hospital from 1/22/2025 to 01/24/25. He may return to school on 1/28/25  with the following limitations ten pound weight lifting restriction until follow up appointment.  Tentatively scheduled for  2/7/25 .    If you have any questions or concerns, please don't hesitate to call.         Sincerely,          SABRINA Collins

## 2025-01-23 ENCOUNTER — APPOINTMENT (INPATIENT)
Dept: RADIOLOGY | Facility: HOSPITAL | Age: 35
DRG: 661 | End: 2025-01-23
Payer: COMMERCIAL

## 2025-01-23 ENCOUNTER — ANESTHESIA (INPATIENT)
Dept: PERIOP | Facility: HOSPITAL | Age: 35
DRG: 661 | End: 2025-01-23
Payer: COMMERCIAL

## 2025-01-23 LAB
ALBUMIN SERPL BCG-MCNC: 3.8 G/DL (ref 3.5–5)
ALP SERPL-CCNC: 65 U/L (ref 34–104)
ALT SERPL W P-5'-P-CCNC: 13 U/L (ref 7–52)
ANION GAP SERPL CALCULATED.3IONS-SCNC: 7 MMOL/L (ref 4–13)
AST SERPL W P-5'-P-CCNC: 12 U/L (ref 13–39)
BASOPHILS # BLD AUTO: 0.04 THOUSANDS/ΜL (ref 0–0.1)
BASOPHILS NFR BLD AUTO: 1 % (ref 0–1)
BILIRUB SERPL-MCNC: 1.46 MG/DL (ref 0.2–1)
BUN SERPL-MCNC: 7 MG/DL (ref 5–25)
CALCIUM SERPL-MCNC: 7.8 MG/DL (ref 8.4–10.2)
CHLORIDE SERPL-SCNC: 106 MMOL/L (ref 96–108)
CO2 SERPL-SCNC: 27 MMOL/L (ref 21–32)
CREAT SERPL-MCNC: 0.69 MG/DL (ref 0.6–1.3)
EOSINOPHIL # BLD AUTO: 0.09 THOUSAND/ΜL (ref 0–0.61)
EOSINOPHIL NFR BLD AUTO: 1 % (ref 0–6)
ERYTHROCYTE [DISTWIDTH] IN BLOOD BY AUTOMATED COUNT: 12.4 % (ref 11.6–15.1)
GFR SERPL CREATININE-BSD FRML MDRD: 125 ML/MIN/1.73SQ M
GLUCOSE SERPL-MCNC: 100 MG/DL (ref 65–140)
HCT VFR BLD AUTO: 39.6 % (ref 36.5–49.3)
HGB BLD-MCNC: 13.8 G/DL (ref 12–17)
IMM GRANULOCYTES # BLD AUTO: 0.02 THOUSAND/UL (ref 0–0.2)
IMM GRANULOCYTES NFR BLD AUTO: 0 % (ref 0–2)
LYMPHOCYTES # BLD AUTO: 2.08 THOUSANDS/ΜL (ref 0.6–4.47)
LYMPHOCYTES NFR BLD AUTO: 26 % (ref 14–44)
MCH RBC QN AUTO: 29.6 PG (ref 26.8–34.3)
MCHC RBC AUTO-ENTMCNC: 34.8 G/DL (ref 31.4–37.4)
MCV RBC AUTO: 85 FL (ref 82–98)
MONOCYTES # BLD AUTO: 0.89 THOUSAND/ΜL (ref 0.17–1.22)
MONOCYTES NFR BLD AUTO: 11 % (ref 4–12)
NEUTROPHILS # BLD AUTO: 5.03 THOUSANDS/ΜL (ref 1.85–7.62)
NEUTS SEG NFR BLD AUTO: 61 % (ref 43–75)
NRBC BLD AUTO-RTO: 0 /100 WBCS
PLATELET # BLD AUTO: 241 THOUSANDS/UL (ref 149–390)
PMV BLD AUTO: 10 FL (ref 8.9–12.7)
POTASSIUM SERPL-SCNC: 3.5 MMOL/L (ref 3.5–5.3)
PROT SERPL-MCNC: 6.1 G/DL (ref 6.4–8.4)
RBC # BLD AUTO: 4.66 MILLION/UL (ref 3.88–5.62)
SODIUM SERPL-SCNC: 140 MMOL/L (ref 135–147)
WBC # BLD AUTO: 8.15 THOUSAND/UL (ref 4.31–10.16)

## 2025-01-23 PROCEDURE — 82360 CALCULUS ASSAY QUANT: CPT | Performed by: UROLOGY

## 2025-01-23 PROCEDURE — BT1DZZZ FLUOROSCOPY OF RIGHT KIDNEY, URETER AND BLADDER: ICD-10-PCS | Performed by: UROLOGY

## 2025-01-23 PROCEDURE — 74018 RADEX ABDOMEN 1 VIEW: CPT

## 2025-01-23 PROCEDURE — 99232 SBSQ HOSP IP/OBS MODERATE 35: CPT | Performed by: FAMILY MEDICINE

## 2025-01-23 PROCEDURE — 99232 SBSQ HOSP IP/OBS MODERATE 35: CPT | Performed by: UROLOGY

## 2025-01-23 PROCEDURE — 0T768DZ DILATION OF RIGHT URETER WITH INTRALUMINAL DEVICE, VIA NATURAL OR ARTIFICIAL OPENING ENDOSCOPIC: ICD-10-PCS | Performed by: UROLOGY

## 2025-01-23 PROCEDURE — 52356 CYSTO/URETERO W/LITHOTRIPSY: CPT | Performed by: UROLOGY

## 2025-01-23 PROCEDURE — C1758 CATHETER, URETERAL: HCPCS | Performed by: UROLOGY

## 2025-01-23 PROCEDURE — 85025 COMPLETE CBC W/AUTO DIFF WBC: CPT | Performed by: FAMILY MEDICINE

## 2025-01-23 PROCEDURE — C1894 INTRO/SHEATH, NON-LASER: HCPCS | Performed by: UROLOGY

## 2025-01-23 PROCEDURE — C2625 STENT, NON-COR, TEM W/DEL SY: HCPCS | Performed by: UROLOGY

## 2025-01-23 PROCEDURE — 0TC68ZZ EXTIRPATION OF MATTER FROM RIGHT URETER, VIA NATURAL OR ARTIFICIAL OPENING ENDOSCOPIC: ICD-10-PCS | Performed by: UROLOGY

## 2025-01-23 PROCEDURE — C1769 GUIDE WIRE: HCPCS | Performed by: UROLOGY

## 2025-01-23 PROCEDURE — 80053 COMPREHEN METABOLIC PANEL: CPT | Performed by: FAMILY MEDICINE

## 2025-01-23 DEVICE — INLAY OPTIMA URETERAL STENT W/O GUIDEWIRE
Type: IMPLANTABLE DEVICE | Site: URETER | Status: FUNCTIONAL
Brand: BARD® INLAY OPTIMA® URETERAL STENT

## 2025-01-23 RX ORDER — LIDOCAINE HYDROCHLORIDE 10 MG/ML
INJECTION, SOLUTION EPIDURAL; INFILTRATION; INTRACAUDAL; PERINEURAL AS NEEDED
Status: DISCONTINUED | OUTPATIENT
Start: 2025-01-23 | End: 2025-01-23

## 2025-01-23 RX ORDER — KETOROLAC TROMETHAMINE 30 MG/ML
INJECTION, SOLUTION INTRAMUSCULAR; INTRAVENOUS AS NEEDED
Status: DISCONTINUED | OUTPATIENT
Start: 2025-01-23 | End: 2025-01-23

## 2025-01-23 RX ORDER — HYDROMORPHONE HCL/PF 1 MG/ML
0.5 SYRINGE (ML) INJECTION
Status: DISCONTINUED | OUTPATIENT
Start: 2025-01-23 | End: 2025-01-23 | Stop reason: HOSPADM

## 2025-01-23 RX ORDER — MAGNESIUM HYDROXIDE 1200 MG/15ML
LIQUID ORAL AS NEEDED
Status: DISCONTINUED | OUTPATIENT
Start: 2025-01-23 | End: 2025-01-23 | Stop reason: HOSPADM

## 2025-01-23 RX ORDER — DEXMEDETOMIDINE HYDROCHLORIDE 4 UG/ML
INJECTION, SOLUTION INTRAVENOUS AS NEEDED
Status: DISCONTINUED | OUTPATIENT
Start: 2025-01-23 | End: 2025-01-23

## 2025-01-23 RX ORDER — ONDANSETRON 2 MG/ML
4 INJECTION INTRAMUSCULAR; INTRAVENOUS ONCE AS NEEDED
Status: DISCONTINUED | OUTPATIENT
Start: 2025-01-23 | End: 2025-01-23 | Stop reason: HOSPADM

## 2025-01-23 RX ORDER — CIPROFLOXACIN 2 MG/ML
400 INJECTION, SOLUTION INTRAVENOUS ONCE
Status: DISCONTINUED | OUTPATIENT
Start: 2025-01-23 | End: 2025-01-23

## 2025-01-23 RX ORDER — MIDAZOLAM HYDROCHLORIDE 2 MG/2ML
INJECTION, SOLUTION INTRAMUSCULAR; INTRAVENOUS AS NEEDED
Status: DISCONTINUED | OUTPATIENT
Start: 2025-01-23 | End: 2025-01-23

## 2025-01-23 RX ORDER — OXYBUTYNIN CHLORIDE 5 MG/1
5 TABLET ORAL 3 TIMES DAILY
Status: DISCONTINUED | OUTPATIENT
Start: 2025-01-23 | End: 2025-01-24 | Stop reason: HOSPADM

## 2025-01-23 RX ORDER — SODIUM CHLORIDE, SODIUM LACTATE, POTASSIUM CHLORIDE, CALCIUM CHLORIDE 600; 310; 30; 20 MG/100ML; MG/100ML; MG/100ML; MG/100ML
INJECTION, SOLUTION INTRAVENOUS CONTINUOUS PRN
Status: DISCONTINUED | OUTPATIENT
Start: 2025-01-23 | End: 2025-01-23

## 2025-01-23 RX ORDER — FENTANYL CITRATE/PF 50 MCG/ML
50 SYRINGE (ML) INJECTION
Status: DISCONTINUED | OUTPATIENT
Start: 2025-01-23 | End: 2025-01-23 | Stop reason: HOSPADM

## 2025-01-23 RX ORDER — DEXAMETHASONE SODIUM PHOSPHATE 10 MG/ML
INJECTION, SOLUTION INTRAMUSCULAR; INTRAVENOUS AS NEEDED
Status: DISCONTINUED | OUTPATIENT
Start: 2025-01-23 | End: 2025-01-23

## 2025-01-23 RX ORDER — PHENAZOPYRIDINE HYDROCHLORIDE 100 MG/1
100 TABLET, FILM COATED ORAL
Status: DISCONTINUED | OUTPATIENT
Start: 2025-01-23 | End: 2025-01-24 | Stop reason: HOSPADM

## 2025-01-23 RX ORDER — PROPOFOL 10 MG/ML
INJECTION, EMULSION INTRAVENOUS AS NEEDED
Status: DISCONTINUED | OUTPATIENT
Start: 2025-01-23 | End: 2025-01-23

## 2025-01-23 RX ORDER — ONDANSETRON 2 MG/ML
INJECTION INTRAMUSCULAR; INTRAVENOUS AS NEEDED
Status: DISCONTINUED | OUTPATIENT
Start: 2025-01-23 | End: 2025-01-23

## 2025-01-23 RX ORDER — FENTANYL CITRATE 50 UG/ML
INJECTION, SOLUTION INTRAMUSCULAR; INTRAVENOUS AS NEEDED
Status: DISCONTINUED | OUTPATIENT
Start: 2025-01-23 | End: 2025-01-23

## 2025-01-23 RX ORDER — LIDOCAINE HYDROCHLORIDE 20 MG/ML
JELLY TOPICAL AS NEEDED
Status: DISCONTINUED | OUTPATIENT
Start: 2025-01-23 | End: 2025-01-23 | Stop reason: HOSPADM

## 2025-01-23 RX ADMIN — ACETAMINOPHEN 325MG 650 MG: 325 TABLET ORAL at 08:14

## 2025-01-23 RX ADMIN — PHENAZOPYRIDINE 100 MG: 100 TABLET ORAL at 15:14

## 2025-01-23 RX ADMIN — MIDAZOLAM 2 MG: 1 INJECTION INTRAMUSCULAR; INTRAVENOUS at 13:34

## 2025-01-23 RX ADMIN — LIDOCAINE HYDROCHLORIDE 50 MG: 10 INJECTION, SOLUTION EPIDURAL; INFILTRATION; INTRACAUDAL at 13:36

## 2025-01-23 RX ADMIN — SODIUM CHLORIDE, SODIUM GLUCONATE, SODIUM ACETATE, POTASSIUM CHLORIDE, MAGNESIUM CHLORIDE, SODIUM PHOSPHATE, DIBASIC, AND POTASSIUM PHOSPHATE 125 ML/HR: .53; .5; .37; .037; .03; .012; .00082 INJECTION, SOLUTION INTRAVENOUS at 22:45

## 2025-01-23 RX ADMIN — FENTANYL CITRATE 50 MCG: 50 INJECTION INTRAMUSCULAR; INTRAVENOUS at 13:36

## 2025-01-23 RX ADMIN — DOCUSATE SODIUM 100 MG: 100 CAPSULE, LIQUID FILLED ORAL at 08:08

## 2025-01-23 RX ADMIN — CEFTRIAXONE 1000 MG: 1 INJECTION, SOLUTION INTRAVENOUS at 08:08

## 2025-01-23 RX ADMIN — OXYBUTYNIN CHLORIDE 5 MG: 5 TABLET ORAL at 15:14

## 2025-01-23 RX ADMIN — PROPOFOL 200 MG: 10 INJECTION, EMULSION INTRAVENOUS at 13:36

## 2025-01-23 RX ADMIN — SODIUM CHLORIDE, SODIUM GLUCONATE, SODIUM ACETATE, POTASSIUM CHLORIDE, MAGNESIUM CHLORIDE, SODIUM PHOSPHATE, DIBASIC, AND POTASSIUM PHOSPHATE 125 ML/HR: .53; .5; .37; .037; .03; .012; .00082 INJECTION, SOLUTION INTRAVENOUS at 01:42

## 2025-01-23 RX ADMIN — KETOROLAC TROMETHAMINE 30 MG: 30 INJECTION, SOLUTION INTRAMUSCULAR; INTRAVENOUS at 14:33

## 2025-01-23 RX ADMIN — SODIUM CHLORIDE, SODIUM LACTATE, POTASSIUM CHLORIDE, AND CALCIUM CHLORIDE: .6; .31; .03; .02 INJECTION, SOLUTION INTRAVENOUS at 13:34

## 2025-01-23 RX ADMIN — DOCUSATE SODIUM 100 MG: 100 CAPSULE, LIQUID FILLED ORAL at 17:38

## 2025-01-23 RX ADMIN — DEXMEDETOMIDINE HYDROCHLORIDE 12 MCG: 400 INJECTION INTRAVENOUS at 13:36

## 2025-01-23 RX ADMIN — SODIUM CHLORIDE, SODIUM GLUCONATE, SODIUM ACETATE, POTASSIUM CHLORIDE, MAGNESIUM CHLORIDE, SODIUM PHOSPHATE, DIBASIC, AND POTASSIUM PHOSPHATE 125 ML/HR: .53; .5; .37; .037; .03; .012; .00082 INJECTION, SOLUTION INTRAVENOUS at 10:22

## 2025-01-23 RX ADMIN — ONDANSETRON 4 MG: 2 INJECTION INTRAMUSCULAR; INTRAVENOUS at 13:36

## 2025-01-23 RX ADMIN — TAMSULOSIN HYDROCHLORIDE 0.4 MG: 0.4 CAPSULE ORAL at 17:38

## 2025-01-23 RX ADMIN — DEXAMETHASONE SODIUM PHOSPHATE 10 MG: 10 INJECTION, SOLUTION INTRAMUSCULAR; INTRAVENOUS at 13:36

## 2025-01-23 RX ADMIN — OXYBUTYNIN CHLORIDE 5 MG: 5 TABLET ORAL at 22:45

## 2025-01-23 NOTE — ANESTHESIA POSTPROCEDURE EVALUATION
Post-Op Assessment Note    CV Status:  Stable    Pain management: adequate       Mental Status:  Alert and awake   Hydration Status:  Euvolemic   PONV Controlled:  Controlled   Airway Patency:  Patent     Post Op Vitals Reviewed: Yes    No anethesia notable event occurred.    Staff: Anesthesiologist         Last Filed PACU Vitals:  Vitals Value Taken Time   Temp 97.5 °F (36.4 °C) 01/23/25 1515   Pulse 77 01/23/25 1515   /87 01/23/25 1515   Resp 16 01/23/25 1515   SpO2 96 % 01/23/25 1515       Modified Danny:     Vitals Value Taken Time   Activity 2 01/23/25 1515   Respiration 2 01/23/25 1515   Circulation 2 01/23/25 1515   Consciousness 2 01/23/25 1515   Oxygen Saturation 2 01/23/25 1515     Modified Danny Score: 10

## 2025-01-23 NOTE — PLAN OF CARE
Problem: PAIN - ADULT  Goal: Verbalizes/displays adequate comfort level or baseline comfort level  Description: Interventions:  - Encourage patient to monitor pain and request assistance  - Assess pain using appropriate pain scale  - Administer analgesics based on type and severity of pain and evaluate response  - Implement non-pharmacological measures as appropriate and evaluate response  - Consider cultural and social influences on pain and pain management  - Notify physician/advanced practitioner if interventions unsuccessful or patient reports new pain  Outcome: Progressing     Problem: INFECTION - ADULT  Goal: Absence or prevention of progression during hospitalization  Description: INTERVENTIONS:  - Assess and monitor for signs and symptoms of infection  - Monitor lab/diagnostic results  - Monitor all insertion sites, i.e. indwelling lines, tubes, and drains  - Monitor endotracheal if appropriate and nasal secretions for changes in amount and color  - [place] appropriate cooling/warming therapies per order  - Administer medications as ordered  - Instruct and encourage patient and family to use good hand hygiene technique  - Identify and instruct in appropriate isolation precautions for identified infection/condition  Outcome: Progressing  Goal: Absence of fever/infection during neutropenic period  Description: INTERVENTIONS:  - Monitor WBC    Outcome: Progressing     Problem: SAFETY ADULT  Goal: Patient will remain free of falls  Description: INTERVENTIONS:  - Educate patient/family on patient safety including physical limitations  - Instruct patient to call for assistance with activity   - Consult OT/PT to assist with strengthening/mobility   - Keep Call bell within reach  - Keep bed low and locked with side rails adjusted as appropriate  - Keep care items and personal belongings within reach  - Initiate and maintain comfort rounds  - Make Fall Risk Sign visible to staff  - Offer Toileting every 2 Hours,  in advance of need  - Initiate/Maintain alarm  - Obtain necessary fall risk management equipment  - Apply yellow socks and bracelet for high fall risk patients  - Consider moving patient to room near nurses station  Outcome: Progressing  Goal: Maintain or return to baseline ADL function  Description: INTERVENTIONS:  -  Assess patient's ability to carry out ADLs; assess patient's baseline for ADL function and identify physical deficits which impact ability to perform ADLs (bathing, care of mouth/teeth, toileting, grooming, dressing, etc.)  - Assess/evaluate cause of self-care deficits   - Assess range of motion  - Assess patient's mobility; develop plan if impaired  - Assess patient's need for assistive devices and provide as appropriate  - Encourage maximum independence but intervene and supervise when necessary  - Involve family in performance of ADLs  - Assess for home care needs following discharge   - Consider OT consult to assist with ADL evaluation and planning for discharge  - Provide patient education as appropriate  Outcome: Progressing  Goal: Maintains/Returns to pre admission functional level  Description: INTERVENTIONS:  - Perform AM-PAC 6 Click Basic Mobility/ Daily Activity assessment daily.  - Set and communicate daily mobility goal to care team and patient/family/caregiver.   - Collaborate with rehabilitation services on mobility goals if consulted  - Perform Range of Motion - times a day.  - Reposition patient every - hours.  - Dangle patient - times a day  - Stand patient - times a day  - Ambulate patient - times a day  - Out of bed to chair - times a day   - Out of bed for meals - times a day  - Out of bed for toileting  - Record patient progress and toleration of activity level   Outcome: Progressing     Problem: DISCHARGE PLANNING  Goal: Discharge to home or other facility with appropriate resources  Description: INTERVENTIONS:  - Identify barriers to discharge w/patient and caregiver  - Arrange  for needed discharge resources and transportation as appropriate  - Identify discharge learning needs (meds, wound care, etc.)  - Arrange for interpretive services to assist at discharge as needed  - Refer to Case Management Department for coordinating discharge planning if the patient needs post-hospital services based on physician/advanced practitioner order or complex needs related to functional status, cognitive ability, or social support system  Outcome: Progressing     Problem: Knowledge Deficit  Goal: Patient/family/caregiver demonstrates understanding of disease process, treatment plan, medications, and discharge instructions  Description: Complete learning assessment and assess knowledge base.  Interventions:  - Provide teaching at level of understanding  - Provide teaching via preferred learning methods  Outcome: Progressing

## 2025-01-23 NOTE — OP NOTE
OPERATIVE REPORT  PATIENT NAME: Denver Vera    :  1990  MRN: [id number]  Pt Location: [facility] OR ROOM 01    SURGERY DATE: 2025    Surgeons and Role:     * Florian Mills MD - Primary    Preop Diagnosis:  Right ureteral calculus [N20.1]    Post-Op Diagnosis Codes:     * Right ureteral calculus [N20.1]    Procedure(s):  Right - CYSTOSCOPY URETEROSCOPY WITH LITHOTRIPSY HOLMIUM LASER. RETROGRADE PYELOGRAM AND INSERTION STENT URETERAL    Specimen(s):  ID Type Source Tests Collected by Time Destination   A : Right ureteral stone Calculus Ureter, Right STONE ANALYSIS Florian Mills MD 2025  2:45 PM        Estimated Blood Loss:   Minimal    Drains:  Ureteral Internal Stent Right ureter 6 Fr. (Active)   Number of days: 0       Anesthesia Type:   General    Operative Indications:  Right ureteral calculus [N20.1]  Impacted right ureteral stone.  Right flank pain    Operative Findings:  Impacted 7 mm right upper ureteral stone    Flexible U scope and laser lithotripsy performed.  6 Indonesian by 28 cm Bard [place] inlay double-J ureteral stent placed.  Inflammatory changes related to stone in region of upper ureter.  Some of stone fragments washed out and some of these fragments sent for chemical analysis.      Complications:   None    Procedure and Technique:  Patient was brought to the operating room .he was identified and a timeout was satisfactory.  He was placed in the supine position underwent his anesthetic by the anesthesia department.    Patient was then placed in lithotomy position.  Penis was prepped and draped along with the remainder of the genitalia in usual sterile fashion using Betadine.  A well-lubricated 22 Indonesian cystoscope was then used for cystoscopy.  Urethra is normal.  There was no gross evidence of stricture or lesion.  The prostate is nonobstructing.  Patient does have a somewhat high median bar.  The bladder was carefully inspected with both 30 and 70 degree lens.  There were no bladder  tumors stones or diverticula appreciated.    A 035 hybrid Glidewire was able to be advanced up the right ureter and negotiated beyond the stone in the region of the right upper ureter off of L2-L3.  The stone could be seen and was radiopaque.  The guidewire was confirmed to be in good position and this was secured to the drape    A dual-lumen catheter was then used to dilate the right ureter and also perform a right retrograde pyelogram.  I passed the dual-lumen catheter over the existing guidewire and once in the ureter I performed a right retrograde pyelogram.  This revealed a normal distal right ureter some narrowing at the level of the ureter at the stone and some proximal dilation of the ureter.    I attempted to place a second guidewire as a safety wire but this would not pass beyond the level of the stone.  The initially placed guidewire remained in good position this has been confirmed with pyelography.  There was no extravasation.    I then performed right ureteroscopy with the semirigid scope.  I was able to get up to just over the level of the iliac vessels but the ureter was somewhat tight and would not allow further passage of the scope.  The semirigid scope was removed.  Prior to complete removal I advanced a guidewire up the right ureter under direct vision to the level of just distal to the stone.    I then placed a 10/12 Haitian reentry sheath up the right ureter to the level of the mid right ureter just distal to the stone over the second guidewire.    With the sheath in good position fluoroscopically I then passed the Olympus flexible ureteroscope over the guidewire and on up to position just below the stone.  The ureter was inspected and was satisfactory.  A photograph of the stone was taken.  There was some inflammatory changes at the level of the stone.  It was it was somewhat impacted in this location.  There was no evidence of any extruded stone.    The stone was well-visualized.  I then used  the 200 µm holmium laser fiber to fragment the stone into tiny pieces.  These were all less than diameter of the guidewire, less than 2 mm in diameter.  At the completion of fragmentation I performed more proximal ureteroscopy and did not appreciate any additional fragments.  Pullout ureteroscopy was then performed.  There were inflammatory changes noted at the level of previous stone location but no evidence of extruded stone ureteral injury or perforation.  There was no significant bleeding.  The guidewire remained in good position and this was visibly confirmed.  The remainder of the ureter was unremarkable.  Upon removal of the reentry sheath and scope some small fragments washed into the bladder.  These were evacuated and some of them were sent for chemical analysis.  The scope laser fiber and reentry sheath were intact and inspected.    The guidewire remained in good position.  I then back through the cystoscope and a 6 Setswana by 28 cm Bard [place] inlay double-J ureteral stent was placed.  A short string tail resided in the bladder.  Urine from the right ureteral orifice and stent were somewhat blood-tinged.    The bladder was drained and all instruments removed final imaging confirmed good stent position and we did not appreciate any residual radiopaque stone along the ureter.    The patient's anesthetic was reversed and he was returned to the recovery room in satisfactory condition.  We plan to keep him overnight for observation and hopefully discharge in the morning.  He should go home on an antibiotic, Pyridium and an appropriate pain medication.  We plan for stent removal in 2 weeks    I was present the entire procedure    Patient Disposition:  PACU             SIGNATURE: Paul Mills MD  DATE: January 23, 2025  TIME: 2:48 PM

## 2025-01-23 NOTE — ASSESSMENT & PLAN NOTE
Imaging showing:. Obstructing stone in the proximal to mid right ureter measuring 7 mm, causing mild right hydroureteronephrosis. Asymmetrically enlarged right kidney demonstrating delayed enhancement, likely related to obstructive uropathy.     Operative Findings:  Impacted 7 mm right upper ureteral stone     Flexible U scope and laser lithotripsy performed.  6 Iranian by 28 cm Bard [place] inlay double-J ureteral stent placed.  Inflammatory changes related to stone in region of upper ureter.  Some of stone fragments washed out and some of these fragments sent for chemical analysis.  Discharging on an antibiotic, Pyridium and an appropriate pain medication. plan for stent removal in 2 weeks

## 2025-01-23 NOTE — UTILIZATION REVIEW
Initial Clinical Review    Admission: Date/Time/Statement:   Admission Orders (From admission, onward)       Ordered        01/22/25 0849  INPATIENT ADMISSION  Once                          Orders Placed This Encounter   Procedures    INPATIENT ADMISSION     Standing Status:   Standing     Number of Occurrences:   1     Level of Care:   Med Surg [16]     Estimated length of stay:   More than 2 Midnights     Certification:   I certify that inpatient services are medically necessary for this patient for a duration of greater than two midnights. See H&P and MD Progress Notes for additional information about the patient's course of treatment.     ED Arrival Information       Expected   -    Arrival   1/22/2025 05:37    Acuity   Urgent              Means of arrival   Walk-In    Escorted by   Self    Service   Hospitalist    Admission type   Emergency              Arrival complaint   flank pain             Chief Complaint   Patient presents with    Flank Pain     Pt was seen here on Monday, states he has a 7mm kidney stone. Reporting inc right flank pain that moves into his back. Also reporting N/V. Denies fevers. Took naproxen at 0430, provided some relief.       Initial Presentation: 34 y.o. male to ED via walk-in from home  Present to ED with worsening right flank pain, sharp in nature, radiates towards groin.  Mild urine color change.  Associated with nausea, mild chills..  Had 1 episode of vomiting in ED.   PMHX Gilbert's syndrome   Admitted to [facility] with DX: Right ureteral calculus   on exam: hypertensive; T 99.1; right CVA tenderness; pain 8/10; K 3.3; T bili 1.08  CT Imaging showing:. Obstructing stone in the proximal to mid right ureter measuring 7 mm, causing mild right hydroureteronephrosis. Asymmetrically enlarged right kidney demonstrating delayed enhancement, likely related to obstructive uropathy.   PLAN: cont iv abx; cont ivf; monitor labs; pain / nausea control (see below); NPO; Discussed the case with  urology, plan to proceed with urologic procedure on 1/23/2025       Anticipated Length of Stay/Certification Statement: Patient will be admitted on an inpatient basis with an anticipated length of stay of greater than 2 midnights secondary to monitorable conditions.       Date: 1/23/25      Day 2   OP REPORT  SURGERY DATE: 1/23/2025   Procedure(s): Right - CYSTOSCOPY URETEROSCOPY WITH LITHOTRIPSY HOLMIUM LASER. RETROGRADE PYELOGRAM AND INSERTION STENT URETERAL  Anesthesia Type: General  Operative Findings: Impacted 7 mm right upper ureteral stone. Flexible U scope and laser lithotripsy performed.  6 [place] by 28 cm [place] inlay double-J ureteral stent placed.  Inflammatory changes related to stone in region of upper ureter.  Some of stone fragments washed out and some of these fragments sent for chemical analysis.        ED Treatment-Medication Administration from 01/22/2025 0537 to 01/22/2025 0943         Date/Time Order Dose Route Action     01/22/2025 0615 sodium chloride 0.9 % bolus 1,000 mL 1,000 mL Intravenous New Bag     01/22/2025 0619 morphine injection 4 mg 4 mg Intravenous Given     01/22/2025 0619 ketorolac (TORADOL) injection 15 mg 15 mg Intravenous Given     01/22/2025 0619 ondansetron (ZOFRAN) injection 4 mg 4 mg Intravenous Given     01/22/2025 0922 ondansetron (ZOFRAN) injection 4 mg 4 mg Intravenous Given     01/22/2025 0920 docusate sodium (COLACE) capsule 100 mg 100 mg Oral Given     01/22/2025 0920 polyethylene glycol (MIRALAX) packet 17 g 17 g Oral Given     01/22/2025 0922 HYDROmorphone (DILAUDID) injection 0.5 mg 0.5 mg Intravenous Given     01/22/2025 0925 cefTRIAXone (ROCEPHIN) IVPB (premix in dextrose) 1,000 mg 50 mL 1,000 mg Intravenous New Bag     01/22/2025 0920 potassium chloride (Klor-Con M20) CR tablet 40 mEq 40 mEq Oral Given            Scheduled Medications:  cefTRIAXone, 1,000 mg, Intravenous, Q24H  docusate sodium, 100 mg, Oral, BID  polyethylene glycol, 17 g, Oral,  Daily  tamsulosin, 0.4 mg, Oral, Daily With Dinner      Continuous IV Infusions:  multi-electrolyte, 125 mL/hr, Intravenous, Continuous      PRN Meds:  acetaminophen, 650 mg, Oral, Q6H PRN  HYDROmorphone, 0.5 mg, Intravenous, Q4H PRN  ondansetron, 4 mg, Intravenous, Q6H PRN  (1/22 recd x1)   oxyCODONE, 2.5 mg, Oral, Q4H PRN  (1/22 recd x1)       ED Triage Vitals   Temperature Pulse Respirations Blood Pressure SpO2 Pain Score   01/22/25 0548 01/22/25 0548 01/22/25 0548 01/22/25 0548 01/22/25 0548 01/22/25 0619   99.1 °F (37.3 °C) 88 18 169/92 97 % 8     Weight (last 2 days)       Date/Time Weight    01/22/25 1721 98 (216)            Vital Signs (last 3 days)       Date/Time Temp Pulse Resp BP MAP (mmHg) SpO2 O2 Device Cardiac (WDL) Patient Position - Orthostatic VS [place] Coma Scale Score Pain    01/23/25 1515 97.5 °F (36.4 °C) 77 16 146/87 111 96 % None (Room air) WDL -- 15 No Pain    01/23/25 1500 98 °F (36.7 °C) 72 18 130/82 101 97 % None (Room air) WDL -- 3 --    01/23/25 1214 99.1 °F (37.3 °C) 76 18 147/100 -- 97 % None (Room air) -- -- -- 2    01/23/25 1100 -- -- -- -- -- 97 % -- -- -- -- --    01/23/25 1054 -- -- -- -- -- 95 % -- -- -- -- No Pain    01/23/25 0814 -- -- -- -- -- -- -- -- -- -- 2    01/23/25 07:10:56 98.6 °F (37 °C) 92 16 136/89 105 95 % -- -- -- -- --    01/22/25 2300 -- 75 -- -- -- -- -- -- -- -- --    01/22/25 2200 -- 74 18 128/75 93 95 % None (Room air) -- -- -- No Pain    01/22/25 21:50:05 97.9 °F (36.6 °C) 77 16 128/75 93 95 % -- -- -- -- --    01/22/25 1757 -- -- -- -- -- 96 % -- -- -- -- 5    01/22/25 1500 -- -- -- -- -- 95 % -- -- -- -- --    01/22/25 14:40:26 -- 83 14 129/75 93 96 % -- -- -- -- --    01/22/25 1342 -- -- -- -- -- 96 % None (Room air) -- -- -- No Pain    01/22/25 09:50:37 97.7 °F (36.5 °C) 78 12 130/76 94 94 % -- -- -- -- --    01/22/25 0946 -- -- -- -- -- -- -- -- -- -- No Pain    01/22/25 0922 -- -- -- -- -- -- -- -- -- -- 6    01/22/25 0756 -- -- -- -- -- -- -- -- --  -- 4    01/22/25 0730 -- 81 -- 126/67 90 96 % -- -- -- -- --    01/22/25 0647 -- -- -- -- -- -- None (Room air) -- -- -- --    01/22/25 0630 -- 84 16 130/68 92 95 % None (Room air) -- Lying -- --    01/22/25 0619 -- -- -- -- -- -- -- -- -- -- 8    01/22/25 0548 99.1 °F (37.3 °C) 88 18 169/92 123 97 % None (Room air) -- Sitting -- --              Pertinent Labs/Diagnostic Test Results:     Results from last 7 days   Lab Units 01/23/25 0437 01/22/25 0613 01/20/25  1410   WBC Thousand/uL 8.15 9.71 15.01*   HEMOGLOBIN g/dL 13.8 14.8 16.5   HEMATOCRIT % 39.6 42.4 47.2   PLATELETS Thousands/uL 241 253 265   TOTAL NEUT ABS Thousands/µL 5.03 7.52 12.95*        Results from last 7 days   Lab Units 01/23/25 0437 01/22/25 0613 01/20/25  1410   SODIUM mmol/L 140 139 138   POTASSIUM mmol/L 3.5 3.3* 3.5   CHLORIDE mmol/L 106 103 102   CO2 mmol/L 27 29 27   ANION GAP mmol/L 7 7 9   BUN mg/dL 7 16 12   CREATININE mg/dL 0.69 0.78 0.75   EGFR ml/min/1.73sq m 125 119 121   CALCIUM mg/dL 7.8* 8.5 9.1     Results from last 7 days   Lab Units 01/23/25 0437 01/22/25 0613 01/20/25  1410   AST U/L 12* 13 16   ALT U/L 13 18 20   ALK PHOS U/L 65 74 75   TOTAL PROTEIN g/dL 6.1* 6.9 7.7   ALBUMIN g/dL 3.8 4.4 4.9   TOTAL BILIRUBIN mg/dL 1.46* 1.08* 1.77*        Results from last 7 days   Lab Units 01/23/25 0437 01/22/25 0613 01/20/25  1410   GLUCOSE RANDOM mg/dL 100 117 128        Results from last 7 days   Lab Units 01/22/25  0613   PROTIME seconds 12.7   INR  0.92   PTT seconds 26        Results from last 7 days   Lab Units 01/22/25  0613   LACTIC ACID mmol/L 1.1        Results from last 7 days   Lab Units 01/20/25  1410   LIPASE u/L 26        Results from last 7 days   Lab Units 01/22/25  0840 01/20/25  1522   CLARITY UA  Slightly Cloudy Clear   COLOR UA  Yellow Yellow   SPEC GRAV UA  1.010 1.010   PH UA  7.0 7.5   GLUCOSE UA mg/dl Negative Negative   KETONES UA mg/dl Negative Trace*   BLOOD UA  Large* Large*   PROTEIN UA mg/dl  Negative Negative   NITRITE UA  Negative Negative   BILIRUBIN UA  Negative Negative   UROBILINOGEN UA E.U./dl 0.2 0.2   LEUKOCYTES UA  Negative Negative   WBC UA /hpf 2-4 2-4   RBC UA /hpf 20-30* Innumerable*   BACTERIA UA /hpf Occasional Occasional   EPITHELIAL CELLS WET PREP /hpf Occasional Occasional   MUCUS THREADS   --  Occasional*        Results from last 7 days   Lab Units 01/22/25  0613   BLOOD CULTURE  No Growth at 24 hrs.  No Growth at 24 hrs.          Past Medical History:   Diagnosis Date    Back pain     Gilbert syndrome     Lumbar herniated disc     Motion sickness         Admitting Diagnosis: Vomiting [R11.10]  Flank pain [R10.9]  Right ureteral calculus [N20.1]  Kidney stone on right side [N20.0]  Acute right flank pain [R10.9]  Intractable pain [R52]  Age/Sex: 34 y.o. male    Network Utilization Review Department  ATTENTION: Please call with any questions or concerns to [phone] and carefully listen to the prompts so that you are directed to the right person. All voicemails are confidential.   For Discharge needs, contact Care Management DC Support Team at [phone] opt. 2  Send all requests for admission clinical reviews, approved or denied determinations and any other requests to dedicated fax number below belonging to the campus where the patient is receiving treatment. List of dedicated fax numbers for the Facilities:  FACILITY NAME UR FAX NUMBER   ADMISSION DENIALS (Administrative/Medical Necessity) [phone]   DISCHARGE SUPPORT TEAM (NETWORK) [phone]   PARENT CHILD HEALTH (Maternity/NICU/Pediatrics) [phone]   [facility] [phone]   [facility] [phone]   [facility] [phone]   General acute hospital [phone]   [facility] [phone]   [facility] [phone]   [facility] [phone]    [facility] [phone]   [facility] [phone]   [facility] [phone]   [facility] [phone]   [facility] [phone]

## 2025-01-23 NOTE — ASSESSMENT & PLAN NOTE
Imaging showing:. Obstructing stone in the proximal to mid right ureter measuring 7 mm, causing mild right hydroureteronephrosis. Asymmetrically enlarged right kidney demonstrating delayed enhancement, likely related to obstructive uropathy.     Continue IV antibiotic, IV hydration, patient remain n.p.o. and pending urologic procedure

## 2025-01-23 NOTE — PROGRESS NOTES
Progress Note - Urology   Name: Denver Vera 34 y.o. male I MRN: [id number]  Unit/Bed#: -01 I Date of Admission: 1/22/2025   Date of Service: 1/23/2025 I Hospital Day: 1     Assessment & Plan  Right ureteral calculus  Known 7mm right proximal to mid ureteral calculus, failed MET  Pain controlled with analgesics overnight  Continue IV hydration, anti emetics, pain control  Strain all urine  Flomax 0.4mg daily  Plan: cystoscopy, right retrograde pyelogram, laser lithotripsy, possible basket stone extraction, right ureteral stent insertion   OP urology follow up    Right flank pain  Plan as above    Discussed post operative course and follow up care after cystoscopy including stent management.  Options also discussed on mitigating pain post operatively including increasing hydration, Tylenol, heating pad as needed over the bladder.  As well as oxybutynin and Pyridium.  We also discussed discharge antibiotics, likely a 5 day course with Ceftin 500mg BID. Non pharmaceutical interventions like increased water intake and heating pad over bladder and flank also discussed.  All questions were answered, pt understands and agrees.  Still agreeable to surgery today.  Op follow up to be determined after intra-op examination and findings      24 Hour Events :   Subjective :     Denver Vera  is a 34 y.o. male patient with known right-sided kidney stone and planned cystoscopy, right retrograde pyelogram, laser lithotripsy, possible basket stone extraction, right ureteral stent insertion.  He endorses feeling well.  Denies frequency, urgency, dysuria, hematuria, no pain at present time.  No new or worsening symptoms. Urine with 20-30RBC, on ceftriaxone.          Objective :  Temp:  [97.7 °F (36.5 °C)-98.6 °F (37 °C)] 98.6 °F (37 °C)  HR:  [74-92] 92  BP: (128-136)/(75-89) 136/89  Resp:  [12-18] 16  SpO2:  [94 %-96 %] 95 %  O2 Device: None (Room air)    Physical Exam  Vitals and nursing note reviewed.   Constitutional:        General: He is not in acute distress.     Appearance: Normal appearance. He is well-developed. He is not ill-appearing.   HENT:      Head: Normocephalic and atraumatic.      Right Ear: External ear normal.      Left Ear: External ear normal.      Nose: Nose normal.   Eyes:      Extraocular Movements: Extraocular movements intact.      Pupils: Pupils are equal, round, and reactive to light.   Cardiovascular:      Rate and Rhythm: Normal rate and regular rhythm.   Pulmonary:      Effort: Pulmonary effort is normal. No respiratory distress.      Breath sounds: Normal breath sounds.   Abdominal:      Palpations: Abdomen is soft.      Tenderness: There is no abdominal tenderness. There is no right CVA tenderness, left CVA tenderness or guarding.   Musculoskeletal:         General: No swelling. Normal range of motion.      Cervical back: Normal range of motion and neck supple.   Skin:     General: Skin is warm and dry.      Capillary Refill: Capillary refill takes less than 2 seconds.   Neurological:      General: No focal deficit present.      Mental Status: He is alert and oriented to person, place, and time. Mental status is at baseline.   Psychiatric:         Mood and Affect: Mood normal.         Behavior: Behavior normal.         Thought Content: Thought content normal.         Judgment: Judgment normal.         Lab Results: I have reviewed the following results:    Imaging Results Review: No pertinent imaging studies reviewed.  Other Study Results Review: No additional pertinent studies reviewed.    VTE Pharmacologic Prophylaxis: VTE covered by:    None     VTE Mechanical Prophylaxis: reason for no mechanical VTE prophylaxis ambulatory    Administrative Statements   I have spent a total time of 30 minutes in caring for this patient on the day of the visit/encounter including Risks and benefits of tx options, Instructions for management, Importance of tx compliance, Risk factor reductions, Documenting in the medical  record, and Communicating with other healthcare professionals .

## 2025-01-23 NOTE — PLAN OF CARE
Problem: PAIN - ADULT  Goal: Verbalizes/displays adequate comfort level or baseline comfort level  Description: Interventions:  - Encourage patient to monitor pain and request assistance  - Assess pain using appropriate pain scale  - Administer analgesics based on type and severity of pain and evaluate response  - Implement non-pharmacological measures as appropriate and evaluate response  - Consider cultural and social influences on pain and pain management  - Notify physician/advanced practitioner if interventions unsuccessful or patient reports new pain  Outcome: Progressing     Problem: INFECTION - ADULT  Goal: Absence or prevention of progression during hospitalization  Description: INTERVENTIONS:  - Assess and monitor for signs and symptoms of infection  - Monitor lab/diagnostic results  - Monitor all insertion sites, i.e. indwelling lines, tubes, and drains  - Monitor endotracheal if appropriate and nasal secretions for changes in amount and color  - [place] appropriate cooling/warming therapies per order  - Administer medications as ordered  - Instruct and encourage patient and family to use good hand hygiene technique  - Identify and instruct in appropriate isolation precautions for identified infection/condition  Outcome: Progressing  Goal: Absence of fever/infection during neutropenic period  Description: INTERVENTIONS:  - Monitor WBC    Outcome: Progressing     Problem: SAFETY ADULT  Goal: Patient will remain free of falls  Description: INTERVENTIONS:  - Educate patient/family on patient safety including physical limitations  - Instruct patient to call for assistance with activity   - Consult OT/PT to assist with strengthening/mobility   - Keep Call bell within reach  - Keep bed low and locked with side rails adjusted as appropriate  - Keep care items and personal belongings within reach  - Initiate and maintain comfort rounds  - Make Fall Risk Sign visible to staff  - Consider moving patient to room  near nurses station  Outcome: Progressing  Goal: Maintain or return to baseline ADL function  Description: INTERVENTIONS:  -  Assess patient's ability to carry out ADLs; assess patient's baseline for ADL function and identify physical deficits which impact ability to perform ADLs (bathing, care of mouth/teeth, toileting, grooming, dressing, etc.)  - Assess/evaluate cause of self-care deficits   - Assess range of motion  - Assess patient's mobility; develop plan if impaired  - Assess patient's need for assistive devices and provide as appropriate  - Encourage maximum independence but intervene and supervise when necessary  - Involve family in performance of ADLs  - Assess for home care needs following discharge   - Consider OT consult to assist with ADL evaluation and planning for discharge  - Provide patient education as appropriate  Outcome: Progressing  Goal: Maintains/Returns to pre admission functional level  Description: INTERVENTIONS:  - Perform AM-PAC 6 Click Basic Mobility/ Daily Activity assessment daily.  - Set and communicate daily mobility goal to care team and patient/family/caregiver.   - Collaborate with rehabilitation services on mobility goals if consulted  - Out of bed for meals 3 times a day  - Out of bed for toileting  - Record patient progress and toleration of activity level   Outcome: Progressing     Problem: DISCHARGE PLANNING  Goal: Discharge to home or other facility with appropriate resources  Description: INTERVENTIONS:  - Identify barriers to discharge w/patient and caregiver  - Arrange for needed discharge resources and transportation as appropriate  - Identify discharge learning needs (meds, wound care, etc.)  - Arrange for interpretive services to assist at discharge as needed  - Refer to Case Management Department for coordinating discharge planning if the patient needs post-hospital services based on physician/advanced practitioner order or complex needs related to functional  status, cognitive ability, or social support system  Outcome: Progressing     Problem: Knowledge Deficit  Goal: Patient/family/caregiver demonstrates understanding of disease process, treatment plan, medications, and discharge instructions  Description: Complete learning assessment and assess knowledge base.  Interventions:  - Provide teaching at level of understanding  - Provide teaching via preferred learning methods  Outcome: Progressing

## 2025-01-23 NOTE — PROGRESS NOTES
Progress Note - Hospitalist   Name: Denver Vera 34 y.o. male I MRN: [id number]  Unit/Bed#: -01 I Date of Admission: 1/22/2025   Date of Service: 1/23/2025 I Hospital Day: 1    Assessment & Plan  Right ureteral calculus  Imaging showing:. Obstructing stone in the proximal to mid right ureter measuring 7 mm, causing mild right hydroureteronephrosis. Asymmetrically enlarged right kidney demonstrating delayed enhancement, likely related to obstructive uropathy.     Continue IV antibiotic, IV hydration, patient remain n.p.o. and pending urologic procedure  Right flank pain  Secondary to obstructive uropathy secondary to renal stone  Continue IV hydration, pain management,  Urology is planning to proceed with urologic procedure on 1/23/25-keep patient n.p.o. after midnight.  Left renal stone  Multiple nonobstructing left-sided renal stones are visualized measuring up to 8 mm in the left lower pole. No obstructing renal stone. No left-sided hydronephrosis.     Continue pain medication IV hydration,  Appreciate urology recommendation.  Hypokalemia  Same 3.3, will supplement, recheck    VTE Pharmacologic Prophylaxis: VTE Score: 1 Low Risk (Score 0-2) - Encourage Ambulation.    Mobility:   Basic Mobility Inpatient Raw Score: 24  JH-HLM Goal: 8: Walk 250 feet or more  JH-HLM Achieved: 7: Walk 25 feet or more  JH-HLM Goal achieved. Continue to encourage appropriate mobility.    Patient Centered Rounds: I performed bedside rounds with nursing staff today.   Discussions with Specialists or Other Care Team Provider: Urology    Education and Discussions with Family / Patient:  With patient.     Current Length of Stay: 1 day(s)  Current Patient Status: Inpatient   Certification Statement: The patient will continue to require additional inpatient hospital stay due to monitorable conditions  Discharge Plan: Anticipate discharge in 24-48 hrs to home.    Code Status: Level 1 - Full Code    Subjective   Seen and evaluated during  the run.  Resting comfortably.  Moving around.  Denies any significant complaint.    Objective :  Temp:  [97.9 °F (36.6 °C)-98.6 °F (37 °C)] 98.6 °F (37 °C)  HR:  [74-92] 92  BP: (128-136)/(75-89) 136/89  Resp:  [14-18] 16  SpO2:  [95 %-97 %] 97 %  O2 Device: None (Room air)    Body mass index is 29.71 kg/m².     Input and Output Summary (last 24 hours):     Intake/Output Summary (Last 24 hours) at 1/23/2025 1203  Last data filed at 1/23/2025 0601  Gross per 24 hour   Intake 1103.33 ml   Output 800 ml   Net 303.33 ml       Physical Exam  Vitals and nursing note reviewed.   HENT:      Mouth/Throat:      Mouth: Mucous membranes are moist.   Eyes:      General: No scleral icterus.        Left eye: No discharge.      Extraocular Movements: Extraocular movements intact.      Conjunctiva/sclera: Conjunctivae normal.      Pupils: Pupils are equal, round, and reactive to light.   Cardiovascular:      Rate and Rhythm: Normal rate.      Heart sounds: No murmur heard.     No friction rub. No gallop.   Pulmonary:      Effort: Pulmonary effort is normal. No respiratory distress.      Breath sounds: No stridor. No wheezing or rhonchi.   Abdominal:      General: There is no distension.      Palpations: There is no mass.      Hernia: No hernia is present.   Musculoskeletal:      Right lower leg: No edema.      Left lower leg: No edema.   Neurological:      Mental Status: He is alert.      Cranial Nerves: No cranial nerve deficit.           Lines/Drains:              Lab Results: I have reviewed the following results:   Results from last 7 days   Lab Units 01/23/25  0437   WBC Thousand/uL 8.15   HEMOGLOBIN g/dL 13.8   HEMATOCRIT % 39.6   PLATELETS Thousands/uL 241   SEGS PCT % 61   LYMPHO PCT % 26   MONO PCT % 11   EOS PCT % 1     Results from last 7 days   Lab Units 01/23/25  0437   SODIUM mmol/L 140   POTASSIUM mmol/L 3.5   CHLORIDE mmol/L 106   CO2 mmol/L 27   BUN mg/dL 7   CREATININE mg/dL 0.69   ANION GAP mmol/L 7   CALCIUM  mg/dL 7.8*   ALBUMIN g/dL 3.8   TOTAL BILIRUBIN mg/dL 1.46*   ALK PHOS U/L 65   ALT U/L 13   AST U/L 12*   GLUCOSE RANDOM mg/dL 100     Results from last 7 days   Lab Units 01/22/25  0613   INR  0.92             Results from last 7 days   Lab Units 01/22/25  0613   LACTIC ACID mmol/L 1.1       Recent Cultures (last 7 days):   Results from last 7 days   Lab Units 01/22/25  0613   BLOOD CULTURE  No Growth at 24 hrs.  No Growth at 24 hrs.       Imaging Results Review: No pertinent imaging studies reviewed.  Other Study Results Review: No additional pertinent studies reviewed.    Last 24 Hours Medication List:     Current Facility-Administered Medications:     acetaminophen (TYLENOL) tablet 650 mg, Q6H PRN    cefTRIAXone (ROCEPHIN) IVPB (premix in dextrose) 1,000 mg 50 mL, Q24H, Last Rate: 1,000 mg (01/23/25 0808)    docusate sodium (COLACE) capsule 100 mg, BID    HYDROmorphone (DILAUDID) injection 0.5 mg, Q4H PRN    multi-electrolyte (PLASMALYTE-A/ISOLYTE-S PH 7.4) IV solution, Continuous, Last Rate: 125 mL/hr (01/23/25 1022)    ondansetron (ZOFRAN) injection 4 mg, Q6H PRN    oxyCODONE (ROXICODONE) IR tablet 2.5 mg, Q4H PRN    polyethylene glycol (MIRALAX) packet 17 g, Daily    tamsulosin (FLOMAX) capsule 0.4 mg, Daily With Dinner    Administrative Statements   Today, Patient Was Seen By: Allan Joe MD      **Please Note: This note may have been constructed using a voice recognition system.**

## 2025-01-23 NOTE — ANESTHESIA POSTPROCEDURE EVALUATION
Post-Op Assessment Note    CV Status:  Stable    Pain management: adequate    Multimodal analgesia used between 6 hours prior to anesthesia start to PACU discharge    Mental Status:  Sleepy   Hydration Status:  Euvolemic   PONV Controlled:  Controlled   Airway Patency:  Patent     Post Op Vitals Reviewed: Yes    No anethesia notable event occurred.    Staff: CRNA           Last Filed PACU Vitals:  Vitals Value Taken Time   Temp 98 °F (36.7 °C) 01/23/25 1500   Pulse 72 01/23/25 1501   /82 01/23/25 1501   Resp 14 01/23/25 1501   SpO2 97 % 01/23/25 1501   Vitals shown include unfiled device data.    Modified Danny:     Vitals Value Taken Time   Activity 0 01/23/25 1500   Respiration 2 01/23/25 1500   Circulation 2 01/23/25 1500   Consciousness 0 01/23/25 1500   Oxygen Saturation 2 01/23/25 1500     Modified Danny Score: 6

## 2025-01-23 NOTE — DISCHARGE SUMMARY
Discharge Summary - Hospitalist   Name: Denver Vera 34 y.o. male I MRN: [id number]  Unit/Bed#: -01 I Date of Admission: 1/22/2025   Date of Service: 1/24/2025 I Hospital Day: 2     Assessment & Plan  Right ureteral calculus  Imaging showing:. Obstructing stone in the proximal to mid right ureter measuring 7 mm, causing mild right hydroureteronephrosis. Asymmetrically enlarged right kidney demonstrating delayed enhancement, likely related to obstructive uropathy.     Operative Findings:  Impacted 7 mm right upper ureteral stone     Flexible U scope and laser lithotripsy performed.  6 Urdu by 28 cm [place] inlay double-J ureteral stent placed.  Inflammatory changes related to stone in region of upper ureter.  Some of stone fragments washed out and some of these fragments sent for chemical analysis.  Discharging on an antibiotic, Pyridium and an appropriate pain medication. plan for stent removal in 2 weeks   Right flank pain  Secondary to obstructive uropathy secondary to renal stone  Continue IV hydration, pain management,  Urology is planning to proceed with urologic procedure on 1/23/25-keep patient n.p.o. after midnight.  Left renal stone  Multiple nonobstructing left-sided renal stones are visualized measuring up to 8 mm in the left lower pole. No obstructing renal stone. No left-sided hydronephrosis.     Continue pain medication IV hydration,  Appreciate urology recommendation.  Hypokalemia  Same 3.3, will supplement, recheck     Discharging Physician / Practitioner: Allan Joe MD  PCP: [place] DO  Admission Date:   Admission Orders (From admission, onward)       Ordered        01/22/25 0849  INPATIENT ADMISSION  Once                          Discharge Date: 01/24/25    Medical Problems        Consultations During Hospital Stay:  Urology    Procedures Performed:   XR abdomen 1 view kub   Final Result by Tyler Antonio MD (01/23 2272)      Fluoroscopy provided for procedure  guidance.      Please refer to the separate procedure note for additional details.                     Workstation performed: [id number]               Significant Findings / Test Results:   Lab Results   Component Value Date    WBC 12.78 (H) 01/24/2025    HGB 14.3 01/24/2025    HCT 40.1 01/24/2025    MCV 85 01/24/2025     01/24/2025     Lab Results   Component Value Date    SODIUM 139 01/24/2025    K 3.5 01/24/2025     01/24/2025    CO2 28 01/24/2025    AGAP 7 01/24/2025    BUN 11 01/24/2025    CREATININE 0.67 01/24/2025    GLUC 107 01/24/2025    CALCIUM 8.4 01/24/2025    AST 11 (L) 01/24/2025    ALT 15 01/24/2025    ALKPHOS 64 01/24/2025    TP 6.5 01/24/2025    TBILI 1.32 (H) 01/24/2025    EGFR 127 01/24/2025         Incidental Findings:   As mentioned above    Test Results Pending at Discharge (will require follow up):   none     Outpatient Tests Requested:  none    Complications:  none    Reason for Admission: Flank pain    Hospital Course:     Denver Vera is a 34 y.o. male patient who originally presented to the hospital on 1/22/2025 due to flank pain secondary to impacted stone, status post urologic procedure done with cystoscopy.  Stent placement.  Tolerating well.  Patient advised to follow-up with urology in 2 weeks to reevaluation.  Sending with antibiotic, pain medication.     All lab results commending findings, treatment plan option discussed in details with patient.  Verbalized understanding agrees.    Please see above list of diagnoses and related plan for additional information.     Condition at Discharge: stable     Discharge Day Visit / Exam:     Subjective: Seen and evaluated and examined.  Resting comfortably.  Denies any significant complaint.  Vitals: Blood Pressure: 117/66 (01/24/25 0711)  Pulse: 86 (01/24/25 0711)  Temperature: 98.4 °F (36.9 °C) (01/24/25 0711)  Temp Source: Temporal (01/23/25 2151)  Respirations: 17 (01/24/25 0711)  Weight - Scale: 98 kg (216 lb) (01/22/25  1721)  SpO2: 96 % (01/24/25 0711)  Exam:   Physical Exam  Vitals and nursing note reviewed.   Constitutional:       Appearance: Normal appearance. He is not ill-appearing or diaphoretic.   HENT:      Mouth/Throat:      Mouth: Mucous membranes are moist.      Pharynx: No oropharyngeal exudate.   Eyes:      General: No scleral icterus.        Left eye: No discharge.      Extraocular Movements: Extraocular movements intact.      Conjunctiva/sclera: Conjunctivae normal.      Pupils: Pupils are equal, round, and reactive to light.   Cardiovascular:      Rate and Rhythm: Normal rate.      Heart sounds: No murmur heard.     No friction rub. No gallop.   Pulmonary:      Effort: Pulmonary effort is normal. No respiratory distress.      Breath sounds: No stridor. No wheezing or rhonchi.   Abdominal:      General: There is no distension.      Palpations: There is no mass.      Tenderness: There is no abdominal tenderness. There is no right CVA tenderness or left CVA tenderness.      Hernia: No hernia is present.   Neurological:      Mental Status: He is alert and oriented to person, place, and time.      Cranial Nerves: No cranial nerve deficit.         Discussion with Family: with patient    Discharge instructions/Information to patient and family:   See after visit summary for information provided to patient and family.      Provisions for Follow-Up Care:  See after visit summary for information related to follow-up care and any pertinent home health orders.      Disposition:     Home    For Discharges to [facility] SNF:   Not Applicable to this Patient - Not Applicable to this Patient    Planned Readmission: If condition get worse     Discharge Statement:  Greater than 50% of the total time was spent examining patient, answering all patient questions, arranging and discussing plan of care with patient as well as directly providing post-discharge instructions.  Additional time then spent on discharge  activities.    Discharge Medications:  See after visit summary for reconciled discharge medications provided to patient and family.      ** Please Note: This note has been constructed using a voice recognition system **

## 2025-01-24 ENCOUNTER — TELEPHONE (OUTPATIENT)
Dept: UROLOGY | Facility: CLINIC | Age: 35
End: 2025-01-24

## 2025-01-24 VITALS
BODY MASS INDEX: 29.71 KG/M2 | HEART RATE: 86 BPM | WEIGHT: 216 LBS | SYSTOLIC BLOOD PRESSURE: 117 MMHG | DIASTOLIC BLOOD PRESSURE: 66 MMHG | RESPIRATION RATE: 17 BRPM | TEMPERATURE: 97.8 F | OXYGEN SATURATION: 96 %

## 2025-01-24 LAB
ALBUMIN SERPL BCG-MCNC: 3.9 G/DL (ref 3.5–5)
ALP SERPL-CCNC: 64 U/L (ref 34–104)
ALT SERPL W P-5'-P-CCNC: 15 U/L (ref 7–52)
ANION GAP SERPL CALCULATED.3IONS-SCNC: 7 MMOL/L (ref 4–13)
AST SERPL W P-5'-P-CCNC: 11 U/L (ref 13–39)
BASOPHILS # BLD AUTO: 0.03 THOUSANDS/ΜL (ref 0–0.1)
BASOPHILS NFR BLD AUTO: 0 % (ref 0–1)
BILIRUB SERPL-MCNC: 1.32 MG/DL (ref 0.2–1)
BUN SERPL-MCNC: 11 MG/DL (ref 5–25)
CALCIUM SERPL-MCNC: 8.4 MG/DL (ref 8.4–10.2)
CHLORIDE SERPL-SCNC: 104 MMOL/L (ref 96–108)
CO2 SERPL-SCNC: 28 MMOL/L (ref 21–32)
CREAT SERPL-MCNC: 0.67 MG/DL (ref 0.6–1.3)
EOSINOPHIL # BLD AUTO: 0.03 THOUSAND/ΜL (ref 0–0.61)
EOSINOPHIL NFR BLD AUTO: 0 % (ref 0–6)
ERYTHROCYTE [DISTWIDTH] IN BLOOD BY AUTOMATED COUNT: 12.1 % (ref 11.6–15.1)
GFR SERPL CREATININE-BSD FRML MDRD: 127 ML/MIN/1.73SQ M
GLUCOSE SERPL-MCNC: 107 MG/DL (ref 65–140)
HCT VFR BLD AUTO: 40.1 % (ref 36.5–49.3)
HGB BLD-MCNC: 14.3 G/DL (ref 12–17)
IMM GRANULOCYTES # BLD AUTO: 0.06 THOUSAND/UL (ref 0–0.2)
IMM GRANULOCYTES NFR BLD AUTO: 1 % (ref 0–2)
LYMPHOCYTES # BLD AUTO: 2.08 THOUSANDS/ΜL (ref 0.6–4.47)
LYMPHOCYTES NFR BLD AUTO: 16 % (ref 14–44)
MCH RBC QN AUTO: 30.2 PG (ref 26.8–34.3)
MCHC RBC AUTO-ENTMCNC: 35.7 G/DL (ref 31.4–37.4)
MCV RBC AUTO: 85 FL (ref 82–98)
MONOCYTES # BLD AUTO: 1.17 THOUSAND/ΜL (ref 0.17–1.22)
MONOCYTES NFR BLD AUTO: 9 % (ref 4–12)
NEUTROPHILS # BLD AUTO: 9.41 THOUSANDS/ΜL (ref 1.85–7.62)
NEUTS SEG NFR BLD AUTO: 74 % (ref 43–75)
NRBC BLD AUTO-RTO: 0 /100 WBCS
PLATELET # BLD AUTO: 247 THOUSANDS/UL (ref 149–390)
PMV BLD AUTO: 9.7 FL (ref 8.9–12.7)
POTASSIUM SERPL-SCNC: 3.5 MMOL/L (ref 3.5–5.3)
PROT SERPL-MCNC: 6.5 G/DL (ref 6.4–8.4)
RBC # BLD AUTO: 4.73 MILLION/UL (ref 3.88–5.62)
SODIUM SERPL-SCNC: 139 MMOL/L (ref 135–147)
WBC # BLD AUTO: 12.78 THOUSAND/UL (ref 4.31–10.16)

## 2025-01-24 PROCEDURE — 85025 COMPLETE CBC W/AUTO DIFF WBC: CPT | Performed by: FAMILY MEDICINE

## 2025-01-24 PROCEDURE — 99232 SBSQ HOSP IP/OBS MODERATE 35: CPT

## 2025-01-24 PROCEDURE — 80053 COMPREHEN METABOLIC PANEL: CPT | Performed by: FAMILY MEDICINE

## 2025-01-24 PROCEDURE — 99239 HOSP IP/OBS DSCHRG MGMT >30: CPT | Performed by: FAMILY MEDICINE

## 2025-01-24 RX ORDER — OXYCODONE HYDROCHLORIDE 5 MG/1
5 TABLET ORAL EVERY 4 HOURS PRN
Qty: 20 TABLET | Refills: 0 | Status: SHIPPED | OUTPATIENT
Start: 2025-01-24

## 2025-01-24 RX ORDER — ACETAMINOPHEN 325 MG/1
650 TABLET ORAL EVERY 6 HOURS PRN
Qty: 30 TABLET | Refills: 0 | Status: SHIPPED | OUTPATIENT
Start: 2025-01-24

## 2025-01-24 RX ORDER — OXYBUTYNIN CHLORIDE 5 MG/1
5 TABLET ORAL 3 TIMES DAILY
Qty: 20 TABLET | Refills: 0 | Status: SHIPPED | OUTPATIENT
Start: 2025-01-24

## 2025-01-24 RX ORDER — CEFUROXIME AXETIL 500 MG/1
500 TABLET ORAL EVERY 12 HOURS SCHEDULED
Qty: 10 TABLET | Refills: 0 | Status: SHIPPED | OUTPATIENT
Start: 2025-01-24 | End: 2025-01-29

## 2025-01-24 RX ORDER — ONDANSETRON 4 MG/1
4 TABLET, FILM COATED ORAL EVERY 8 HOURS PRN
Qty: 20 TABLET | Refills: 0 | Status: SHIPPED | OUTPATIENT
Start: 2025-01-24

## 2025-01-24 RX ORDER — PHENAZOPYRIDINE HYDROCHLORIDE 100 MG/1
200 TABLET, FILM COATED ORAL
Qty: 20 TABLET | Refills: 0 | Status: SHIPPED | OUTPATIENT
Start: 2025-01-24

## 2025-01-24 RX ADMIN — SODIUM CHLORIDE, SODIUM GLUCONATE, SODIUM ACETATE, POTASSIUM CHLORIDE, MAGNESIUM CHLORIDE, SODIUM PHOSPHATE, DIBASIC, AND POTASSIUM PHOSPHATE 125 ML/HR: .53; .5; .37; .037; .03; .012; .00082 INJECTION, SOLUTION INTRAVENOUS at 05:27

## 2025-01-24 RX ADMIN — PHENAZOPYRIDINE 100 MG: 100 TABLET ORAL at 09:04

## 2025-01-24 RX ADMIN — POLYETHYLENE GLYCOL 3350 17 G: 17 POWDER, FOR SOLUTION ORAL at 09:04

## 2025-01-24 RX ADMIN — OXYBUTYNIN CHLORIDE 5 MG: 5 TABLET ORAL at 09:04

## 2025-01-24 RX ADMIN — DOCUSATE SODIUM 100 MG: 100 CAPSULE, LIQUID FILLED ORAL at 09:04

## 2025-01-24 RX ADMIN — PHENAZOPYRIDINE 100 MG: 100 TABLET ORAL at 11:04

## 2025-01-24 RX ADMIN — CEFTRIAXONE 1000 MG: 1 INJECTION, SOLUTION INTRAVENOUS at 09:12

## 2025-01-24 NOTE — PLAN OF CARE
Problem: PAIN - ADULT  Goal: Verbalizes/displays adequate comfort level or baseline comfort level  Description: Interventions:  - Encourage patient to monitor pain and request assistance  - Assess pain using appropriate pain scale  - Administer analgesics based on type and severity of pain and evaluate response  - Implement non-pharmacological measures as appropriate and evaluate response  - Consider cultural and social influences on pain and pain management  - Notify physician/advanced practitioner if interventions unsuccessful or patient reports new pain  1/24/2025 1030 by Madelyn Squires RN  Outcome: Adequate for Discharge  1/24/2025 0813 by Madelyn Squires RN  Outcome: Progressing     Problem: INFECTION - ADULT  Goal: Absence or prevention of progression during hospitalization  Description: INTERVENTIONS:  - Assess and monitor for signs and symptoms of infection  - Monitor lab/diagnostic results  - Monitor all insertion sites, i.e. indwelling lines, tubes, and drains  - Monitor endotracheal if appropriate and nasal secretions for changes in amount and color  - [place] appropriate cooling/warming therapies per order  - Administer medications as ordered  - Instruct and encourage patient and family to use good hand hygiene technique  - Identify and instruct in appropriate isolation precautions for identified infection/condition  1/24/2025 1030 by Madelyn Squires RN  Outcome: Adequate for Discharge  1/24/2025 0813 by Madelyn Squires RN  Outcome: Progressing     Problem: SAFETY ADULT  Goal: Patient will remain free of falls  Description: INTERVENTIONS:  - Educate patient/family on patient safety including physical limitations  - Instruct patient to call for assistance with activity   - Consult OT/PT to assist with strengthening/mobility   - Keep Call bell within reach  - Keep bed low and locked with side rails adjusted as appropriate  - Keep care items and personal belongings within reach  - Initiate and  maintain comfort rounds    - Apply yellow socks and bracelet for high fall risk patients  - Consider moving patient to room near nurses station  1/24/2025 1030 by Madelyn Squires RN  Outcome: Adequate for Discharge  1/24/2025 0813 by Madelyn Squires RN  Outcome: Progressing  Goal: Maintain or return to baseline ADL function  Description: INTERVENTIONS:  -  Assess patient's ability to carry out ADLs; assess patient's baseline for ADL function and identify physical deficits which impact ability to perform ADLs (bathing, care of mouth/teeth, toileting, grooming, dressing, etc.)  - Assess/evaluate cause of self-care deficits   - Assess range of motion  - Assess patient's mobility; develop plan if impaired  - Assess patient's need for assistive devices and provide as appropriate  - Encourage maximum independence but intervene and supervise when necessary  - Involve family in performance of ADLs  - Assess for home care needs following discharge   - Consider OT consult to assist with ADL evaluation and planning for discharge  - Provide patient education as appropriate  1/24/2025 1030 by Madelyn Squires RN  Outcome: Adequate for Discharge  1/24/2025 0813 by Madelyn Squires RN  Outcome: Progressing  Goal: Maintains/Returns to pre admission functional level  Description: INTERVENTIONS:  - Perform AM-PAC 6 Click Basic Mobility/ Daily Activity assessment daily.  - Set and communicate daily mobility goal to care team and patient/family/caregiver.     - Out of bed for toileting  - Record patient progress and toleration of activity level   1/24/2025 1030 by Madelyn Squires RN  Outcome: Adequate for Discharge  1/24/2025 0813 by Madelyn Squires RN  Outcome: Progressing     Problem: DISCHARGE PLANNING  Goal: Discharge to home or other facility with appropriate resources  Description: INTERVENTIONS:  - Identify barriers to discharge w/patient and caregiver  - Arrange for needed discharge resources and transportation as  appropriate  - Identify discharge learning needs (meds, wound care, etc.)  - Arrange for interpretive services to assist at discharge as needed  - Refer to Case Management Department for coordinating discharge planning if the patient needs post-hospital services based on physician/advanced practitioner order or complex needs related to functional status, cognitive ability, or social support system  1/24/2025 1030 by Madelyn Squires RN  Outcome: Adequate for Discharge  1/24/2025 0813 by Madelyn Squires RN  Outcome: Progressing     Problem: Knowledge Deficit  Goal: Patient/family/caregiver demonstrates understanding of disease process, treatment plan, medications, and discharge instructions  Description: Complete learning assessment and assess knowledge base.  Interventions:  - Provide teaching at level of understanding  - Provide teaching via preferred learning methods  1/24/2025 1030 by Madelyn Squires RN  Outcome: Adequate for Discharge  1/24/2025 0813 by Madelyn Squires RN  Outcome: Progressing

## 2025-01-24 NOTE — TELEPHONE ENCOUNTER
----- Message from Florian Mills MD sent at 1/23/2025  4:34 PM EST -----  Patient needs cystoscopy with right ureteral stent removal in 2 weeks

## 2025-01-24 NOTE — PLAN OF CARE
Problem: PAIN - ADULT  Goal: Verbalizes/displays adequate comfort level or baseline comfort level  Description: Interventions:  - Encourage patient to monitor pain and request assistance  - Assess pain using appropriate pain scale  - Administer analgesics based on type and severity of pain and evaluate response  - Implement non-pharmacological measures as appropriate and evaluate response  - Consider cultural and social influences on pain and pain management  - Notify physician/advanced practitioner if interventions unsuccessful or patient reports new pain  Outcome: Progressing     Problem: INFECTION - ADULT  Goal: Absence or prevention of progression during hospitalization  Description: INTERVENTIONS:  - Assess and monitor for signs and symptoms of infection  - Monitor lab/diagnostic results  - Monitor all insertion sites, i.e. indwelling lines, tubes, and drains  - Monitor endotracheal if appropriate and nasal secretions for changes in amount and color  - [place] appropriate cooling/warming therapies per order  - Administer medications as ordered  - Instruct and encourage patient and family to use good hand hygiene technique  - Identify and instruct in appropriate isolation precautions for identified infection/condition  Outcome: Progressing     Problem: SAFETY ADULT  Goal: Patient will remain free of falls  Description: INTERVENTIONS:  - Educate patient/family on patient safety including physical limitations  - Instruct patient to call for assistance with activity   - Consult OT/PT to assist with strengthening/mobility   - Keep Call bell within reach  - Keep bed low and locked with side rails adjusted as appropriate  - Keep care items and personal belongings within reach  - Initiate and maintain comfort rounds  - Make Fall Risk Sign visible to staff    - Apply yellow socks and bracelet for high fall risk patients  - Consider moving patient to room near nurses station  Outcome: Progressing  Goal: Maintain or  return to baseline ADL function  Description: INTERVENTIONS:  -  Assess patient's ability to carry out ADLs; assess patient's baseline for ADL function and identify physical deficits which impact ability to perform ADLs (bathing, care of mouth/teeth, toileting, grooming, dressing, etc.)  - Assess/evaluate cause of self-care deficits   - Assess range of motion  - Assess patient's mobility; develop plan if impaired  - Assess patient's need for assistive devices and provide as appropriate  - Encourage maximum independence but intervene and supervise when necessary  - Involve family in performance of ADLs  - Assess for home care needs following discharge   - Consider OT consult to assist with ADL evaluation and planning for discharge  - Provide patient education as appropriate  Outcome: Progressing  Goal: Maintains/Returns to pre admission functional level  Description: INTERVENTIONS:  - Perform AM-PAC 6 Click Basic Mobility/ Daily Activity assessment daily.  - Set and communicate daily mobility goal to care team and patient/family/caregiver.   - Collaborate with rehabilitation services on mobility goals if consulted    - Out of bed for toileting  - Record patient progress and toleration of activity level   Outcome: Progressing     Problem: DISCHARGE PLANNING  Goal: Discharge to home or other facility with appropriate resources  Description: INTERVENTIONS:  - Identify barriers to discharge w/patient and caregiver  - Arrange for needed discharge resources and transportation as appropriate  - Identify discharge learning needs (meds, wound care, etc.)  - Arrange for interpretive services to assist at discharge as needed  - Refer to Case Management Department for coordinating discharge planning if the patient needs post-hospital services based on physician/advanced practitioner order or complex needs related to functional status, cognitive ability, or social support system  Outcome: Progressing     Problem: Knowledge  Deficit  Goal: Patient/family/caregiver demonstrates understanding of disease process, treatment plan, medications, and discharge instructions  Description: Complete learning assessment and assess knowledge base.  Interventions:  - Provide teaching at level of understanding  - Provide teaching via preferred learning methods  Outcome: Progressing

## 2025-01-24 NOTE — ASSESSMENT & PLAN NOTE
S/ P Cystoscopy, laser lithotripsy 5mm right ureteral stone, right ureteral stent insertion  Stone analysis pending  Ceftin 500mg BID x 5 days.  Prior use Keflex without reaction   Oxybutynin 5mg TID prn #20   Norco orco 5mg one tablet every 4-6 hours as needed for pain, #20  Pyridium 200mg TID #20 prn  10 pound weight restriction until ureteral stent removed, work note provided  OP urology follow up 2 weeks stent removal

## 2025-01-24 NOTE — PROGRESS NOTES
Progress Note - Urology   Name: Denver Vera 34 y.o. male I MRN: [id number]  Unit/Bed#: -01 I Date of Admission: 1/22/2025   Date of Service: 1/24/2025 I Hospital Day: 2     Assessment & Plan  Right ureteral calculus  S/ P Cystoscopy, laser lithotripsy 5mm right ureteral stone, right ureteral stent insertion  Stone analysis pending  Ceftin 500mg BID x 5 days.  Prior use Keflex without reaction   Oxybutynin 5mg TID prn #20   Norco orco 5mg one tablet every 4-6 hours as needed for pain, #20  Pyridium 200mg TID #20 prn  10 pound weight restriction until ureteral stent removed, work note provided  OP urology follow up 2 weeks stent removal    Education provided regarding ureteral stent. Patient understands some discomfort is normal. The stent is often taken out after the blockage in the ureter is treated or the ureter has healed. This may take 1 week to 2 weeks, or longer. Certain movements may trigger pain or a feeling that you need to urinate. You may also feel mild soreness or pressure before or during urination. These symptoms will go away a few days after the stent is removed.Your urine may be slightly pink or red. This is due to bleeding caused by minor irritation from the stent. This may happen on and off while you have the stent. Drink plenty of fluids to help flush out your urinary tract. We again reviewed importance of eliminating iced tea and incorporating water into his diet due to kidney stones.    Call Your Doctor If:  Your urine contains blood clots or you see a large amount of blood-tinged urine.   You have symptoms similar to those you had before the stent was placed.   You constantly leak urine.  You have a fever over 101 degrees, chills, nausea, or vomiting.  Your pain is not relieved with medication.  The end of the stent comes out of the urethra.     Patient endorses prior use of keflex without reaction.  Confirmed upon chart review. Long discussion regarding cross reactivity rates and  possibility given prior use keflex and current use ceftriaxone.   Recommend to go home on Ceftin 500 mg twice daily x 5 days.  He understands to discontinue medication and call office if any side effects.  Oxybutynin 5 mg TID prn bladder spasm #20.  Pyridium 200 mg 3 times daily as needed dysuria #20.  Norco 5mg one tablet every 4-6 hours as needed for pain, #20 tabs. He understands he will not drive or operate heavy machinery while using narcotics.  He will need OP Urology follow up for stent removal.  Office tasked.  All questions answered, he understands and agrees with the plan.       24 Hour Events : S/ P Cystoscopy, laser lithotripsy 5mm right ureteral stone, right ureteral stent insertion. Flank pain resolved.      Subjective :     Denver Vera  is a 34 y.o. male patient with known right-sided kidney stone. Failed MET and returned to ED in under 48 hours.  Now S/ P Cystoscopy, laser lithotripsy 5mm right ureteral stone, right ureteral stent insertion.  He is afebrile, mild leukocytosis at 12, suspect reactive and post surgical, creatinine 0.67, vitals signs stable. He is voiding well, no hematuria, dysuria, frequency, hesitancy, urgency. Does endorse some stent colic. No nausea,vomiting    Objective :  Temp:  [97.1 °F (36.2 °C)-99.1 °F (37.3 °C)] 98.4 °F (36.9 °C)  HR:  [72-86] 86  BP: (117-156)/() 117/66  Resp:  [16-18] 17  SpO2:  [94 %-97 %] 96 %  O2 Device: None (Room air)    Physical Exam  Vitals and nursing note reviewed.   Constitutional:       General: He is not in acute distress.     Appearance: Normal appearance. He is well-developed. He is not ill-appearing.   HENT:      Head: Normocephalic and atraumatic.      Right Ear: External ear normal.      Left Ear: External ear normal.      Nose: Nose normal.   Eyes:      Extraocular Movements: Extraocular movements intact.      Pupils: Pupils are equal, round, and reactive to light.   Cardiovascular:      Rate and Rhythm: Normal rate and regular  rhythm.      Heart sounds: No murmur heard.  Pulmonary:      Effort: Pulmonary effort is normal. No respiratory distress.      Breath sounds: Normal breath sounds.   Abdominal:      General: Bowel sounds are normal.      Palpations: Abdomen is soft.      Tenderness: There is no abdominal tenderness. There is no right CVA tenderness, left CVA tenderness or guarding.   Musculoskeletal:         General: No swelling. Normal range of motion.      Cervical back: Normal range of motion and neck supple.   Skin:     General: Skin is warm and dry.      Capillary Refill: Capillary refill takes less than 2 seconds.   Neurological:      General: No focal deficit present.      Mental Status: He is alert and oriented to person, place, and time. Mental status is at baseline.   Psychiatric:         Mood and Affect: Mood normal.         Behavior: Behavior normal.         Thought Content: Thought content normal.         Judgment: Judgment normal.         Lab Results: I have reviewed the following results:CBC/BMP:   .     01/24/25  0530   WBC 12.78*   HGB 14.3   HCT 40.1      SODIUM 139   K 3.5      CO2 28   BUN 11   CREATININE 0.67   GLUC 107        Imaging Results Review: No pertinent imaging studies reviewed.  Other Study Results Review: No additional pertinent studies reviewed.    VTE Pharmacologic Prophylaxis: VTE covered by:    None     VTE Mechanical Prophylaxis: reason for no mechanical VTE prophylaxis ambulatory

## 2025-01-24 NOTE — PLAN OF CARE
Problem: PAIN - ADULT  Goal: Verbalizes/displays adequate comfort level or baseline comfort level  Description: Interventions:  - Encourage patient to monitor pain and request assistance  - Assess pain using appropriate pain scale  - Administer analgesics based on type and severity of pain and evaluate response  - Implement non-pharmacological measures as appropriate and evaluate response  - Consider cultural and social influences on pain and pain management  - Notify physician/advanced practitioner if interventions unsuccessful or patient reports new pain  Outcome: Progressing     Problem: INFECTION - ADULT  Goal: Absence or prevention of progression during hospitalization  Description: INTERVENTIONS:  - Assess and monitor for signs and symptoms of infection  - Monitor lab/diagnostic results  - Monitor all insertion sites, i.e. indwelling lines, tubes, and drains  - Monitor endotracheal if appropriate and nasal secretions for changes in amount and color  - [place] appropriate cooling/warming therapies per order  - Administer medications as ordered  - Instruct and encourage patient and family to use good hand hygiene technique  - Identify and instruct in appropriate isolation precautions for identified infection/condition  Outcome: Progressing  Goal: Absence of fever/infection during neutropenic period  Description: INTERVENTIONS:  - Monitor WBC    Outcome: Progressing     Problem: SAFETY ADULT  Goal: Patient will remain free of falls  Description: INTERVENTIONS:  - Educate patient/family on patient safety including physical limitations  - Instruct patient to call for assistance with activity   - Consult OT/PT to assist with strengthening/mobility   - Keep Call bell within reach  - Keep bed low and locked with side rails adjusted as appropriate  - Keep care items and personal belongings within reach  - Initiate and maintain comfort rounds  - Make Fall Risk Sign visible to staff  - Offer Toileting every 2 Hours,  in advance of need  - Initiate/Maintain alarm  - Obtain necessary fall risk management equipment  - Apply yellow socks and bracelet for high fall risk patients  - Consider moving patient to room near nurses station  Outcome: Progressing  Goal: Maintain or return to baseline ADL function  Description: INTERVENTIONS:  -  Assess patient's ability to carry out ADLs; assess patient's baseline for ADL function and identify physical deficits which impact ability to perform ADLs (bathing, care of mouth/teeth, toileting, grooming, dressing, etc.)  - Assess/evaluate cause of self-care deficits   - Assess range of motion  - Assess patient's mobility; develop plan if impaired  - Assess patient's need for assistive devices and provide as appropriate  - Encourage maximum independence but intervene and supervise when necessary  - Involve family in performance of ADLs  - Assess for home care needs following discharge   - Consider OT consult to assist with ADL evaluation and planning for discharge  - Provide patient education as appropriate  Outcome: Progressing  Goal: Maintains/Returns to pre admission functional level  Description: INTERVENTIONS:  - Perform AM-PAC 6 Click Basic Mobility/ Daily Activity assessment daily.  - Set and communicate daily mobility goal to care team and patient/family/caregiver.   - Collaborate with rehabilitation services on mobility goals if consulted  - Perform Range of Motion - times a day.  - Reposition patient every - hours.  - Dangle patient - times a day  - Stand patient - times a day  - Ambulate patient - times a day  - Out of bed to chair - times a day   - Out of bed for meals - times a day  - Out of bed for toileting  - Record patient progress and toleration of activity level   Outcome: Progressing     Problem: DISCHARGE PLANNING  Goal: Discharge to home or other facility with appropriate resources  Description: INTERVENTIONS:  - Identify barriers to discharge w/patient and caregiver  - Arrange  for needed discharge resources and transportation as appropriate  - Identify discharge learning needs (meds, wound care, etc.)  - Arrange for interpretive services to assist at discharge as needed  - Refer to Case Management Department for coordinating discharge planning if the patient needs post-hospital services based on physician/advanced practitioner order or complex needs related to functional status, cognitive ability, or social support system  Outcome: Progressing     Problem: Knowledge Deficit  Goal: Patient/family/caregiver demonstrates understanding of disease process, treatment plan, medications, and discharge instructions  Description: Complete learning assessment and assess knowledge base.  Interventions:  - Provide teaching at level of understanding  - Provide teaching via preferred learning methods  Outcome: Progressing

## 2025-01-26 LAB
BACTERIA BLD CULT: NORMAL
BACTERIA BLD CULT: NORMAL

## 2025-01-27 LAB
BACTERIA BLD CULT: NORMAL
BACTERIA BLD CULT: NORMAL

## 2025-01-27 NOTE — UTILIZATION REVIEW
NOTIFICATION OF ADMISSION DISCHARGE   This is a Notification of Discharge from [facility]. Please be advised that this patient has been discharge from our facility. Below you will find the admission and discharge date and time including the patient’s disposition.   UTILIZATION REVIEW CONTACT:  Karon Ruiz  Utilization   Network Utilization Review Department  Phone: [phone] x carefully listen to the prompts. All voicemails are confidential.  Email: [email or web address]     ADMISSION INFORMATION  PRESENTATION DATE: 1/22/2025  5:44 AM  OBERVATION ADMISSION DATE: N/A  INPATIENT ADMISSION DATE: 1/22/25  8:49 AM   DISCHARGE DATE: 1/24/2025  3:02 PM   DISPOSITION:Home/Self Care    Network Utilization Review Department  ATTENTION: Please call with any questions or concerns to [phone] and carefully listen to the prompts so that you are directed to the right person. All voicemails are confidential.   For Discharge needs, contact Care Management DC Support Team at [phone] opt. 2  Send all requests for admission clinical reviews, approved or denied determinations and any other requests to dedicated fax number below belonging to the campus where the patient is receiving treatment. List of dedicated fax numbers for the Facilities:  FACILITY NAME UR FAX NUMBER   ADMISSION DENIALS (Administrative/Medical Necessity) [phone]   DISCHARGE SUPPORT TEAM ([facility]) [phone]   PARENT CHILD HEALTH (Maternity/NICU/Pediatrics) [phone]   [facility] [phone]   [facility] [phone]   [facility] [phone]   [facility] [phone]   [facility] [phone]   [facility] [phone]   [facility] [phone]   [facility]  [phone]   [facility] [phone]   [facility] [phone]   [facility] [phone]   [facility] [phone]

## 2025-01-28 LAB
CALCIUM OXALATE DIHYDRATE MFR STONE IR: 60 %
COLOR STONE: NORMAL
COM MFR STONE: 10 %
COMMENT-STONE3: NORMAL
COMPOSITION: NORMAL
HYDROXYAPATITE 24H ENGDIFF UR: 30 %
LABORATORY COMMENT REPORT: NORMAL
PHOTO: NORMAL
SIZE STONE: NORMAL MM
SPEC SOURCE SUBJ: NORMAL
STONE ANALYSIS-IMP: NORMAL
WT STONE: 11 MG

## 2025-02-03 RX ORDER — IBUPROFEN 800 MG/1
800 TABLET, FILM COATED ORAL EVERY 6 HOURS SCHEDULED
COMMUNITY
Start: 2024-12-20

## 2025-02-05 ENCOUNTER — TELEPHONE (OUTPATIENT)
Dept: UROLOGY | Facility: CLINIC | Age: 35
End: 2025-02-05

## 2025-02-05 NOTE — LETTER
February 7, 2025     Patient: Denver Vera  YOB: 1990  Date of Visit: 2/7/25      To Whom it May Concern:    Denver Vera is under my professional care. Denver was seen in my office on 2/7/25. Denver may return to work on 2/10/25 with no restrictions .    If you have any questions or concerns, please don't hesitate to call.         Sincerely,          Paul Mills MD

## 2025-02-05 NOTE — TELEPHONE ENCOUNTER
Patient dropped off disability and work release forms to be filled out.  Scanned into chart under the Surgery.,

## 2025-02-06 RX ORDER — CIPROFLOXACIN 500 MG/1
500 TABLET, FILM COATED ORAL ONCE
Status: COMPLETED | OUTPATIENT
Start: 2025-02-07 | End: 2025-02-07

## 2025-02-07 ENCOUNTER — PROCEDURE VISIT (OUTPATIENT)
Dept: UROLOGY | Facility: CLINIC | Age: 35
End: 2025-02-07
Payer: COMMERCIAL

## 2025-02-07 VITALS
DIASTOLIC BLOOD PRESSURE: 80 MMHG | SYSTOLIC BLOOD PRESSURE: 154 MMHG | BODY MASS INDEX: 29.36 KG/M2 | OXYGEN SATURATION: 98 % | HEART RATE: 90 BPM | HEIGHT: 72 IN | TEMPERATURE: 97.4 F | WEIGHT: 216.8 LBS

## 2025-02-07 DIAGNOSIS — N20.1 RIGHT URETERAL CALCULUS: ICD-10-CM

## 2025-02-07 DIAGNOSIS — N20.0 LEFT RENAL STONE: Primary | ICD-10-CM

## 2025-02-07 DIAGNOSIS — N20.0 KIDNEY STONE ON RIGHT SIDE: ICD-10-CM

## 2025-02-07 PROCEDURE — 52310 CYSTOSCOPY AND TREATMENT: CPT | Performed by: UROLOGY

## 2025-02-07 RX ADMIN — CIPROFLOXACIN 500 MG: 500 TABLET, FILM COATED ORAL at 08:30

## 2025-02-07 NOTE — TELEPHONE ENCOUNTER
Spoke with patient and made him aware that his note is done and it states that he has no restrictions. He verbalized understanding of message. Made patient aware that his paperwork will be completed by Monday.

## 2025-02-07 NOTE — TELEPHONE ENCOUNTER
Patient was wondering if he could potentially get a return to work note from the provider while he is waiting for his [employer] [facility] paperwork to be completed.     He was looking to return to work on Monday.     He would appreciate a call back to let him know he can pick one up or even try sending through his MyChart

## 2025-02-07 NOTE — TELEPHONE ENCOUNTER
PT wife called and asking for updated RTW not for pt to RTW 2/10/25 with no lifting restrictions due to current RTW note states lifting restrictions. Pt is not able to work if anything states lifting restrictions. Please enter note into pt mychart due to pt will be going back to work Monday morning     Pt call [phone] Igor (wife)

## 2025-02-07 NOTE — TELEPHONE ENCOUNTER
Patient was calling to check the status of his FMLA paperwork.     He would appreciate a call back to let him know when he can pick these up or have them send to his HR

## 2025-02-10 NOTE — TELEPHONE ENCOUNTER
FMLA form completed but we need patient's signature before faxing. Left message to make patient aware that we need his signature.

## 2025-03-21 ENCOUNTER — OFFICE VISIT (OUTPATIENT)
Dept: UROLOGY | Facility: CLINIC | Age: 35
End: 2025-03-21
Payer: COMMERCIAL

## 2025-03-21 VITALS
SYSTOLIC BLOOD PRESSURE: 144 MMHG | HEIGHT: 71 IN | BODY MASS INDEX: 33.57 KG/M2 | HEART RATE: 92 BPM | TEMPERATURE: 97.2 F | OXYGEN SATURATION: 98 % | DIASTOLIC BLOOD PRESSURE: 90 MMHG | WEIGHT: 239.8 LBS

## 2025-03-21 DIAGNOSIS — N20.0 RENAL CALCULI: ICD-10-CM

## 2025-03-21 DIAGNOSIS — N20.1 RIGHT URETERAL CALCULUS: Primary | ICD-10-CM

## 2025-03-21 PROCEDURE — 99213 OFFICE O/P EST LOW 20 MIN: CPT | Performed by: UROLOGY

## 2025-03-21 NOTE — PATIENT INSTRUCTIONS
Drink at least total 60 ounces fluid daily.    Avoid added salt    Vitamin B6 100 mg daily    Magnesium oxide 500 mg daily    Avoid tea Cokes [employer] chocolate nuts

## 2025-03-21 NOTE — PROGRESS NOTES
UROLOGY PROGRESS NOTE         NAME: Denver Vera  AGE: 34 y.o. SEX: male  : 1990   MRN: [id number]    DATE: 3/21/2025  TIME: 11:03 AM    Assessment and Plan      Impression:   1. Right ureteral calculus  2. Renal calculi  -     XR abdomen 1 view kub; Future; Expected date: 2025  -     XR abdomen 1 view kub; Future; Expected date: 2025       Plan: Patient doing well following his ureteral stone procedure on the right.  No issues since removal of stent.  Patient does have small bilateral renal calculi.  The largest of these appears to be about 5 to 6 mm on the left.  These are nonobstructing.  I offered the patient ESWL.  He will let us know if he is interested.  He prefers watchful waiting at this point we will see him back in 1 year with a KUB x-ray he will call if he has any problems we discussed stone prevention today.  I gave him general instructions regarding this and I also offered to send him to see a nephrologist for more specific stone prevention recommendations and appropriate testing.  He is not interested in that at this point.  He will let us know if he has we will see him back in 1 year.      Chief Complaint     Chief Complaint   Patient presents with   • Follow-up     Review stone prevention and stone analysis     History of Present Illness     HPI: Denver Vera is a 34 y.o. year old male who presents with history of ureteral stone treated with laser lithotripsy patient subsequently had stent removed.  Here for follow-up regarding stone prevention.  Patient has multiple nonobstructing bilateral renal calculi. primarily calcium oxalate            The following portions of the patient's history were reviewed and updated as appropriate: allergies, current medications, past family history, past medical history, past social history, past surgical history and problem list.  Past Medical History:   Diagnosis Date   • Back pain    • Gilbert syndrome    • Lumbar herniated disc    •  "Motion sickness      Past Surgical History:   Procedure Laterality Date   • LIPOMA RESECTION Bilateral 12/9/2024    Procedure: EXCISION LIPOMAS OF RIGHT BUTTOCK, RT UPPER THIGH AND LEFT GROIN;  Surgeon: Le Castillo DO;  Location: [facility];  Service: General   • LIPOMA RESECTION Bilateral 12/31/2024    Procedure: EXCISION LIPOMA OF RIGHT POSTERIOR THIGH LIPOMA X3, EXCISION OF LEFT FOREARM LIPOMA;  Surgeon: Le Castillo DO;  Location: [facility];  Service: General   • OR CYSTO/URETERO W/LITHOTRIPSY &INDWELL STENT INSRT Right 1/23/2025    Procedure: CYSTOSCOPY URETEROSCOPY WITH LITHOTRIPSY HOLMIUM LASER, RETROGRADE PYELOGRAM AND INSERTION STENT URETERAL;  Surgeon: Florian Mills MD;  Location: [facility];  Service: Urology   • REPAIR LABRUM Bilateral    • WISDOM TOOTH EXTRACTION       shoulder  Review of Systems     Const: Denies chills, fever and weight loss.  CV: Denies chest pain.  Resp: Denies SOB.  GI: Denies abdominal pain, nausea and vomiting.  : Denies symptoms other than stated above.  Musculo: Denies back pain.    Objective   /90 (BP Location: Left arm, Patient Position: Sitting, Cuff Size: Standard)   Pulse 92   Temp (!) 97.2 °F (36.2 °C) (Temporal)   Ht 5' 11\" (1.803 m)   Wt 109 kg (239 lb 12.8 oz)   SpO2 98%   BMI 33.45 kg/m²     Physical Exam  Const: Appears healthy and well developed. No signs of acute distress present.  Resp: Respirations are regular and unlabored.   CV: Rate is regular. Rhythm is regular.  Abdomen: Abdomen is soft, nontender, and nondistended. Kidneys are not palpable.  : Not performed  Psych: Patient's attitude is cooperative. Mood is normal. Affect is normal.    Procedure   Procedures     Current Medications     Current Outpatient Medications:   •  acetaminophen (TYLENOL) 325 mg tablet, Take 2 tablets (650 mg total) by mouth every 6 (six) hours as needed for mild pain, headaches or fever, Disp: 30 tablet, Rfl: 0  •  Ascorbic Acid (VITAMIN " C PO), Take by mouth, Disp: , Rfl:   •  B Complex Vitamins (B COMPLEX PO), Take 1 tablet by mouth daily, Disp: , Rfl:   •  Cyanocobalamin (VITAMIN B-12 PO), Take by mouth, Disp: , Rfl:   •  ibuprofen (MOTRIN) 800 mg tablet, Take 800 mg by mouth every 6 (six) hours, Disp: , Rfl:   •  Omega-3 Fatty Acids (FISH OIL PO), Take by mouth, Disp: , Rfl:   •  VITAMIN D PO, Take by mouth, Disp: , Rfl:   •  ondansetron (ZOFRAN) 4 mg tablet, Take 1 tablet (4 mg total) by mouth every 8 (eight) hours as needed for nausea or vomiting (Patient not taking: Reported on 3/21/2025), Disp: 20 tablet, Rfl: 0  •  oxybutynin (DITROPAN) 5 mg tablet, Take 1 tablet (5 mg total) by mouth 3 (three) times a day (Patient not taking: Reported on 3/21/2025), Disp: 20 tablet, Rfl: 0  •  oxyCODONE (ROXICODONE) 5 immediate release tablet, Take 1 tablet (5 mg total) by mouth every 4 (four) hours as needed for moderate pain Max Daily Amount: 30 mg (Patient not taking: Reported on 3/21/2025), Disp: 20 tablet, Rfl: 0  •  phenazopyridine (PYRIDIUM) 100 mg tablet, Take 2 tablets (200 mg total) by mouth 3 (three) times a day with meals (Patient not taking: Reported on 3/21/2025), Disp: 20 tablet, Rfl: 0  •  tamsulosin (FLOMAX) 0.4 mg, Take 1 capsule (0.4 mg total) by mouth daily with dinner (Patient not taking: Reported on 3/21/2025), Disp: 30 capsule, Rfl: 0  •  Vitamin E 200 units TABS, Take by mouth, Disp: , Rfl:         Paul Mills MD

## 2025-04-19 NOTE — DISCHARGE INSTRUCTIONS
You are seen in the emergency department with complaint of abdominal pain, your CT scan results are below, we discussed your case with urology, they will see you in the office.  Please expect a phone call from them.  If your symptoms worsen or persist, please return to the Emergency Department immediately.    IMPRESSION:        1. Obstructing stone in the proximal to mid right ureter measuring 7 mm, causing mild right hydroureteronephrosis. Asymmetrically enlarged right kidney demonstrating delayed enhancement, likely related to obstructive uropathy.     2. Mild urothelial enhancement and fat stranding surrounding the proximal to mid ureter. Superimposed infectious/inflammatory ureteritis is not excluded.     3. Multiple additional bilateral nonobstructing renal stones as described.     4. Mild wall thickening of the transverse colon, which may reflect mild colitis.  
Fall with Harm Risk

## 2025-06-09 ENCOUNTER — OCCMED (OUTPATIENT)
Dept: URGENT CARE | Facility: CLINIC | Age: 35
End: 2025-06-09
Payer: OTHER MISCELLANEOUS

## 2025-06-09 ENCOUNTER — APPOINTMENT (OUTPATIENT)
Dept: URGENT CARE | Facility: CLINIC | Age: 35
End: 2025-06-09

## 2025-06-09 DIAGNOSIS — H93.12 TINNITUS OF LEFT EAR: Primary | ICD-10-CM

## 2025-06-09 PROCEDURE — 99283 EMERGENCY DEPT VISIT LOW MDM: CPT

## 2025-06-09 PROCEDURE — G0382 LEV 3 HOSP TYPE B ED VISIT: HCPCS

## (undated) DEVICE — MEDI-VAC YANK SUCT HNDL W/TPRD BULBOUS TIP: Brand: CARDINAL HEALTH

## (undated) DEVICE — NEPTUNE E-SEP SMOKE EVACUATION PENCIL, COATED, 70MM BLADE, PUSH BUTTON SWITCH: Brand: NEPTUNE E-SEP

## (undated) DEVICE — MICRO HVTSA, 0.5G AND HVTSA SOURCEMARK PRODUCT CODE M1206 AND M1206-01: Brand: EXOFIN MICRO HVTSA, 0.5G

## (undated) DEVICE — URETERAL DUAL LUMEN CATH

## (undated) DEVICE — BETHLEHEM UNIVERSAL MINOR GEN: Brand: CARDINAL HEALTH

## (undated) DEVICE — TELFA NON-ADHERENT ABSORBENT DRESSING: Brand: TELFA

## (undated) DEVICE — PACK TUR

## (undated) DEVICE — EXOFIN PRECISION PEN HIGH VISCOSITY TOPICAL SKIN ADHESIVE: Brand: EXOFIN PRECISION PEN, 1G

## (undated) DEVICE — GUIDEWIRE STRGHT TIP 0.035 IN  SOLO PLUS

## (undated) DEVICE — TUBING SUCTION 5MM X 12 FT

## (undated) DEVICE — BULB SYRINGE,IRRIGATION WITH PROTECTIVE CAP: Brand: DOVER

## (undated) DEVICE — INVIEW CLEAR LEGGINGS: Brand: CONVERTORS

## (undated) DEVICE — ANTIBACTERIAL UNDYED BRAIDED (POLYGLACTIN 910), SYNTHETIC ABSORBABLE SUTURE: Brand: COATED VICRYL

## (undated) DEVICE — GLOVE INDICATOR PI UNDERGLOVE SZ 6.5 BLUE

## (undated) DEVICE — DECANTER: Brand: UNBRANDED

## (undated) DEVICE — NEEDLE 25G X 1 1/2

## (undated) DEVICE — DISPOSABLE OR TOWEL: Brand: CARDINAL HEALTH

## (undated) DEVICE — GARMENT,MEDLINE,DVT,INT,CALF,FOAM,MED: Brand: MEDLINE

## (undated) DEVICE — LUBRICANT JELLY SURGILUBE TUBE 4OZ FLIP TOP

## (undated) DEVICE — URO CATCHER BAG STERILE 0-UC32

## (undated) DEVICE — GLOVE SRG BIOGEL ECLIPSE 6

## (undated) DEVICE — BASIC SINGLE BASIN 2-LF: Brand: MEDLINE INDUSTRIES, INC.

## (undated) DEVICE — SHEATH URETERAL ACCESS 10/12FR 45CM PROXIS

## (undated) DEVICE — SCD SEQUENTIAL COMPRESSION COMFORT SLEEVE MEDIUM KNEE LENGTH: Brand: KENDALL SCD

## (undated) DEVICE — PAD GROUNDING DUAL ADULT

## (undated) DEVICE — PREMIUM DRY TRAY LF: Brand: MEDLINE INDUSTRIES, INC.

## (undated) DEVICE — GLOVE SRG BIOGEL 7